# Patient Record
Sex: FEMALE | Race: WHITE | NOT HISPANIC OR LATINO | Employment: FULL TIME | RURAL
[De-identification: names, ages, dates, MRNs, and addresses within clinical notes are randomized per-mention and may not be internally consistent; named-entity substitution may affect disease eponyms.]

---

## 2018-07-16 NOTE — PROGRESS NOTES
Subjective   Jaylon Christian is a 32 y.o. female.     Problem List  1. Morbid Obesity BMI  >30  2. Tobacco user     PShx  2 hernia repair surgeries in 2007 and 2012   [place] teeth removal       SocialHx  -tobacco smoker for 15 years.  Smokes 1 ppd   -occasionally alcohol user  -no illicit drugs  - for 13 years   -3 children  -She currently is a teller at [employer]    FamliyHx  -mother -cholecystectomy cellulitis.   -father - unknown    Pt is 31 yo female who I am seeing for the first time. She is here to establish. She has not seen PCP in years.  She is from [place]. She has lived all over the  and finally settled with  in [facility]. She has no major medical issues other than hernia repair surgery x 2 in 2007 and 2012. She has history of obesity and BMI >30. She also has history of tobacco user and smoke 1 ppd for >15 years. She has tried to quit with wellbutrin.  She has not tried nicotine patch yet. She is willing to quit. She occasionally drinks alcohol no illicit drugs. She is marreid with 3 children. She currently works for "[employer] as a teller. She has family history of cellulitis and gallbladder issues. Her main concerns is weight gain and she started gaining weight with her last child 6 years ago.  She also notes she may have had thyroid issues and an enlarged thyroid. She has not tried various diets.      Obesity   This is a chronic problem. The current episode started more than 1 year ago. The problem occurs constantly. The problem has been unchanged. Associated symptoms include fatigue. Pertinent negatives include no abdominal pain, anorexia, arthralgias, change in bowel habit, chest pain, chills, congestion, coughing, diaphoresis, fever, headaches, joint swelling, myalgias, nausea, neck pain, numbness, rash, sore throat, swollen glands, urinary symptoms, vertigo, visual change or vomiting. Nothing aggravates the symptoms. She has tried nothing for the symptoms. The treatment provided no  "relief.                The following portions of the patient's history were reviewed and updated as appropriate: allergies, current medications, past family history, past medical history, past social history, past surgical history and problem list.  Patient Active Problem List   Diagnosis   • Annual physical exam   • General medical examination   • Morbid obesity (CMS/HCC)   • Need for vaccination   • Tobacco user   • Encounter for screening for endocrine disorder   • Encounter for screening for diabetes mellitus   • Encounter for screening for cardiovascular disorders     No current outpatient prescriptions on file prior to visit.     No current facility-administered medications on file prior to visit.        Review of Systems   Constitutional: Positive for fatigue and unexpected weight gain. Negative for chills, diaphoresis and fever.   HENT: Negative.  Negative for congestion and sore throat.    Eyes: Negative.    Respiratory: Negative.  Negative for cough.    Cardiovascular: Negative.  Negative for chest pain.   Gastrointestinal: Negative.  Negative for abdominal pain, anorexia, change in bowel habit, nausea and vomiting.   Endocrine: Negative.    Genitourinary: Negative.    Musculoskeletal: Negative.  Negative for arthralgias, joint swelling, myalgias and neck pain.   Skin: Negative.  Negative for rash.   Allergic/Immunologic: Negative.    Neurological: Negative.  Negative for vertigo and numbness.   Hematological: Negative.    Psychiatric/Behavioral: Positive for sleep disturbance.       Objective    /68   Pulse 72   Temp 98.6 °F (37 °C)   Ht 137.2 cm (54\")   Wt 94.7 kg (208 lb 12.8 oz)   LMP 07/13/2018   SpO2 98%   BMI 50.34 kg/m²     No results found for any previous visit.       Physical Exam   Constitutional: She is oriented to person, place, and time. She appears well-developed and well-nourished. No distress.   HENT:   Head: Normocephalic and atraumatic.   Right Ear: External ear normal. "   Left Ear: External ear normal.   Eyes: Conjunctivae and EOM are normal. Pupils are equal, round, and reactive to light. Right eye exhibits no discharge. Left eye exhibits no discharge. No scleral icterus.   Neck: Normal range of motion. Neck supple. No JVD present. No tracheal deviation present. No thyromegaly present.   Cardiovascular: Normal rate, regular rhythm and normal heart sounds.    Pulmonary/Chest: Effort normal and breath sounds normal. No stridor. No respiratory distress. She has no wheezes.   Abdominal: Soft. Bowel sounds are normal. She exhibits no distension. There is no tenderness. There is no guarding.   Obese abdomen    Musculoskeletal: Normal range of motion. She exhibits no edema or deformity.   Lymphadenopathy:     She has no cervical adenopathy.   Neurological: She is alert and oriented to person, place, and time. No cranial nerve deficit.   Skin: Skin is warm and dry. She is not diaphoretic. No erythema. No pallor.   Psychiatric: She has a normal mood and affect. Her behavior is normal.   Nursing note and vitals reviewed.        Assessment/Plan   Problems Addressed this Visit        Digestive    Morbid obesity (CMS/HCC)    Relevant Orders    Vitamin D 25 Hydroxy       Other    Annual physical exam    General medical examination - Primary    Relevant Orders    CBC Auto Differential    Comprehensive Metabolic Panel    Hemoglobin A1c    Lipid Panel    TSH    T4, Free    T3, Free    Vitamin D 25 Hydroxy    Need for vaccination    Relevant Orders    Tdap Vaccine Greater Than or Equal To 6yo IM    Pneumococcal Polysaccharide Vaccine 23-Valent (PPSV23) Greater Than or Equal To 3yo Subcutaneous / IM    Tobacco user    Encounter for screening for endocrine disorder    Relevant Orders    TSH    T4, Free    T3, Free    Encounter for screening for diabetes mellitus    Relevant Orders    Hemoglobin A1c    Encounter for screening for cardiovascular disorders    Relevant Orders    Lipid Panel      Other  Visit Diagnoses     Tobacco abuse counseling          -cbc cmp, lipid panel thyroid studies, hga1c, vitamin D levels  -tobacco user -will restart wellbutrin and will also start on nicotine patch   -morbid obesity - Weight loss information provided today including bariatric surgery   -tdap and pneumonia 23 vaccination today  -tobacco user   -annual physical exam today  -advised pt to be safe and call with any questions or concerns. All question addressed today  -recheck in 2 weeks for followup on labwork         This document has been electronically signed by Marino Alvarado MD on July 17, 2018 9:14 AM

## 2018-07-17 ENCOUNTER — OFFICE VISIT (OUTPATIENT)
Dept: FAMILY MEDICINE CLINIC | Facility: CLINIC | Age: 33
End: 2018-07-17

## 2018-07-17 VITALS
WEIGHT: 208.8 LBS | OXYGEN SATURATION: 98 % | TEMPERATURE: 98.6 F | SYSTOLIC BLOOD PRESSURE: 106 MMHG | BODY MASS INDEX: 48.32 KG/M2 | HEART RATE: 72 BPM | HEIGHT: 55 IN | DIASTOLIC BLOOD PRESSURE: 68 MMHG

## 2018-07-17 DIAGNOSIS — Z13.1 ENCOUNTER FOR SCREENING FOR DIABETES MELLITUS: ICD-10-CM

## 2018-07-17 DIAGNOSIS — Z13.29 ENCOUNTER FOR SCREENING FOR ENDOCRINE DISORDER: ICD-10-CM

## 2018-07-17 DIAGNOSIS — Z00.00 ANNUAL PHYSICAL EXAM: ICD-10-CM

## 2018-07-17 DIAGNOSIS — Z71.6 TOBACCO ABUSE COUNSELING: ICD-10-CM

## 2018-07-17 DIAGNOSIS — Z72.0 TOBACCO USER: ICD-10-CM

## 2018-07-17 DIAGNOSIS — Z00.00 GENERAL MEDICAL EXAMINATION: Primary | ICD-10-CM

## 2018-07-17 DIAGNOSIS — E66.01 MORBID OBESITY (HCC): ICD-10-CM

## 2018-07-17 DIAGNOSIS — Z23 NEED FOR VACCINATION: ICD-10-CM

## 2018-07-17 DIAGNOSIS — Z13.6 ENCOUNTER FOR SCREENING FOR CARDIOVASCULAR DISORDERS: ICD-10-CM

## 2018-07-17 PROCEDURE — 90715 TDAP VACCINE 7 YRS/> IM: CPT | Performed by: FAMILY MEDICINE

## 2018-07-17 PROCEDURE — 90472 IMMUNIZATION ADMIN EACH ADD: CPT | Performed by: FAMILY MEDICINE

## 2018-07-17 PROCEDURE — 99214 OFFICE O/P EST MOD 30 MIN: CPT | Performed by: FAMILY MEDICINE

## 2018-07-17 PROCEDURE — 90732 PPSV23 VACC 2 YRS+ SUBQ/IM: CPT | Performed by: FAMILY MEDICINE

## 2018-07-17 PROCEDURE — 90471 IMMUNIZATION ADMIN: CPT | Performed by: FAMILY MEDICINE

## 2018-07-17 RX ORDER — BUPROPION HYDROCHLORIDE 75 MG/1
75 TABLET ORAL
Qty: 60 TABLET | Refills: 3 | Status: SHIPPED | OUTPATIENT
Start: 2018-07-17 | End: 2018-11-01

## 2018-07-17 RX ORDER — NICOTINE 21 MG/24HR
1 PATCH, TRANSDERMAL 24 HOURS TRANSDERMAL EVERY 24 HOURS
Qty: 30 PATCH | Refills: 3 | Status: SHIPPED | OUTPATIENT
Start: 2018-07-17 | End: 2018-11-01

## 2018-07-17 NOTE — PATIENT INSTRUCTIONS
Steps to Quit Smoking  Smoking tobacco can be harmful to your health and can affect almost every organ in your body. Smoking puts you, and those around you, at risk for developing many serious chronic diseases. Quitting smoking is difficult, but it is one of the best things that you can do for your health. It is never too late to quit.  What are the benefits of quitting smoking?  When you quit smoking, you lower your risk of developing serious diseases and conditions, such as:  · Lung cancer or lung disease, such as COPD.  · Heart disease.  · Stroke.  · Heart attack.  · Infertility.  · Osteoporosis and bone fractures.    Additionally, symptoms such as coughing, wheezing, and shortness of breath may get better when you quit. You may also find that you get sick less often because your body is stronger at fighting off colds and infections. If you are pregnant, quitting smoking can help to reduce your chances of having a baby of low birth weight.  How do I get ready to quit?  When you decide to quit smoking, create a plan to make sure that you are successful. Before you quit:  · Pick a date to quit. Set a date within the next two weeks to give you time to prepare.  · Write down the reasons why you are quitting. Keep this list in places where you will see it often, such as on your bathroom mirror or in your car or wallet.  · Identify the people, places, things, and activities that make you want to smoke (triggers) and avoid them. Make sure to take these actions:  ? Throw away all cigarettes at home, at work, and in your car.  ? Throw away smoking accessories, such as ashtrays and lighters.  ? Clean your car and make sure to empty the ashtray.  ? Clean your home, including curtains and carpets.  · Tell your family, friends, and coworkers that you are quitting. Support from your loved ones can make quitting easier.  · Talk with your health care provider about your options for quitting smoking.  · Find out what treatment  options are covered by your health insurance.    What strategies can I use to quit smoking?  Talk with your healthcare provider about different strategies to quit smoking. Some strategies include:  · Quitting smoking altogether instead of gradually lessening how much you smoke over a period of time. Research shows that quitting “cold turkey” is more successful than gradually quitting.  · Attending in-person counseling to help you build problem-solving skills. You are more likely to have success in quitting if you attend several counseling sessions. Even short sessions of 10 minutes can be effective.  · Finding resources and support systems that can help you to quit smoking and remain smoke-free after you quit. These resources are most helpful when you use them often. They can include:  ? Online chats with a counselor.  ? Telephone quitlines.  ? Printed self-help materials.  ? Support groups or group counseling.  ? Text messaging programs.  ? Mobile phone applications.  · Taking medicines to help you quit smoking. (If you are pregnant or breastfeeding, talk with your health care provider first.) Some medicines contain nicotine and some do not. Both types of medicines help with cravings, but the medicines that include nicotine help to relieve withdrawal symptoms. Your health care provider may recommend:  ? Nicotine patches, gum, or lozenges.  ? Nicotine inhalers or sprays.  ? Non-nicotine medicine that is taken by mouth.    Talk with your health care provider about combining strategies, such as taking medicines while you are also receiving in-person counseling. Using these two strategies together makes you more likely to succeed in quitting than if you used either strategy on its own.  If you are pregnant or breastfeeding, talk with your health care provider about finding counseling or other support strategies to quit smoking. Do not take medicine to help you quit smoking unless told to do so by your health care  provider.  What things can I do to make it easier to quit?  Quitting smoking might feel overwhelming at first, but there is a lot that you can do to make it easier. Take these important actions:  · Reach out to your family and friends and ask that they support and encourage you during this time. Call telephone quitlines, reach out to support groups, or work with a counselor for support.  · Ask people who smoke to avoid smoking around you.  · Avoid places that trigger you to smoke, such as bars, parties, or smoke-break areas at work.  · Spend time around people who do not smoke.  · Lessen stress in your life, because stress can be a smoking trigger for some people. To lessen stress, try:  ? Exercising regularly.  ? Deep-breathing exercises.  ? Yoga.  ? Meditating.  ? Performing a body scan. This involves closing your eyes, scanning your body from head to toe, and noticing which parts of your body are particularly tense. Purposefully relax the muscles in those areas.  · Download or purchase mobile phone or tablet apps (applications) that can help you stick to your quit plan by providing reminders, tips, and encouragement. There are many free apps, such as QuitGuide from the CDC (Centers for Disease Control and Prevention). You can find other support for quitting smoking (smoking cessation) through smokefree.gov and other websites.    How will I feel when I quit smoking?  Within the first 24 hours of quitting smoking, you may start to feel some withdrawal symptoms. These symptoms are usually most noticeable 2-3 days after quitting, but they usually do not last beyond 2-3 weeks. Changes or symptoms that you might experience include:  · Mood swings.  · Restlessness, anxiety, or irritation.  · Difficulty concentrating.  · Dizziness.  · Strong cravings for sugary foods in addition to nicotine.  · Mild weight gain.  · Constipation.  · Nausea.  · Coughing or a sore throat.  · Changes in how your medicines work in your  body.  · A depressed mood.  · Difficulty sleeping (insomnia).    After the first 2-3 weeks of quitting, you may start to notice more positive results, such as:  · Improved sense of smell and taste.  · Decreased coughing and sore throat.  · Slower heart rate.  · Lower blood pressure.  · Clearer skin.  · The ability to breathe more easily.  · Fewer sick days.    Quitting smoking is very challenging for most people. Do not get discouraged if you are not successful the first time. Some people need to make many attempts to quit before they achieve long-term success. Do your best to stick to your quit plan, and talk with your health care provider if you have any questions or concerns.  This information is not intended to replace advice given to you by your health care provider. Make sure you discuss any questions you have with your health care provider.  Document Released: 12/12/2002 Document Revised: 08/15/2017 Document Reviewed: 05/03/2016  [email or web address] Interactive Patient Education © 2018 [email or web address]    Exercising to Lose Weight  Exercising can help you to lose weight. In order to lose weight through exercise, you need to do vigorous-intensity exercise. You can tell that you are exercising with vigorous intensity if you are breathing very hard and fast and cannot hold a conversation while exercising.  Moderate-intensity exercise helps to maintain your current weight. You can tell that you are exercising at a moderate level if you have a higher heart rate and faster breathing, but you are still able to hold a conversation.  How often should I exercise?  Choose an activity that you enjoy and set realistic goals. Your health care provider can help you to make an activity plan that works for you. Exercise regularly as directed by your health care provider. This may include:  · Doing resistance training twice each week, such as:  ? Push-ups.  ? Sit-ups.  ? Lifting weights.  ? Using resistance bands.  · Doing a given intensity  of exercise for a given amount of time. Choose from these options:  ? 150 minutes of moderate-intensity exercise every week.  ? 75 minutes of vigorous-intensity exercise every week.  ? A mix of moderate-intensity and vigorous-intensity exercise every week.    Children, pregnant women, people who are out of shape, people who are overweight, and older adults may need to consult a health care provider for individual recommendations. If you have any sort of medical condition, be sure to consult your health care provider before starting a new exercise program.  What are some activities that can help me to lose weight?  · Walking at a rate of at least 4.5 miles an hour.  · Jogging or running at a rate of 5 miles per hour.  · Biking at a rate of at least 10 miles per hour.  · Lap swimming.  · Roller-skating or in-line skating.  · Cross-country skiing.  · Vigorous competitive sports, such as football, basketball, and soccer.  · Jumping rope.  · Aerobic dancing.  How can I be more active in my day-to-day activities?  · Use the stairs instead of the elevator.  · Take a walk during your lunch break.  · If you drive, park your car farther away from work or school.  · If you take public transportation, get off one stop early and walk the rest of the way.  · Make all of your phone calls while standing up and walking around.  · Get up, stretch, and walk around every 30 minutes throughout the day.  What guidelines should I follow while exercising?  · Do not exercise so much that you hurt yourself, feel dizzy, or get very short of breath.  · Consult your health care provider prior to starting a new exercise program.  · Wear comfortable clothes and shoes with good support.  · Drink plenty of water while you exercise to prevent dehydration or heat stroke. Body water is lost during exercise and must be replaced.  · Work out until you breathe faster and your heart beats faster.  This information is not intended to replace advice given  to you by your health care provider. Make sure you discuss any questions you have with your health care provider.  Document Released: 01/20/2012 Document Revised: 05/25/2017 Document Reviewed: 05/21/2015  [employer] Interactive Patient Education © 2018 [employer]  Diet Following Bariatric Surgery  The bariatric diet is designed to provide fluids and nourishment while promoting weight loss and healing after bariatric surgery. The diet is divided into 3 stages. Progress to each stage of the diet with your health care provider’s approval.  What do I need to know about my diet following bariatric surgery?  Your surgeon may have individual guidelines for you about specific foods or the progression of your diet. Follow your surgeon's guidelines. You will follow these general guidelines during all stages of your diet:  · Eat at set times.  · Allow 30-45 minutes for each meal.  · Take small bites. Chew your food until it is almost a liquid before swallowing it. Try setting down your utensils between bites to help yourself eat slower or make an “eat slowly” reminder sign.  · Do not drink liquids for 30 minutes before meals and for 30 minutes after meals.  · Drink between meals.  · Stop eating when you are full. If you feel tightness or pressure in your chest, that means you are full. Wait 30 minutes before you try to eat again.  · Take a chewable multivitamin daily in addition to other supplements as directed by your health care provider.  · Sip at least 48-64 oz of liquid, preferably water, each day.  · Stay away from concentrated sweets with more than 10 g of sugar per serving.  · Protein is a very important part of the diet. Have protein with every meal when possible. Try eating your protein food first.    Stage 1 bariatric diet  Stage 1 will begin after surgery and last until about 2 weeks after surgery or as directed by your health care provider. You will be on clear liquids immediately after surgery. After your health  care provider approves, you will move to full liquids. You will eat at scheduled times during the day (for example at 8 AM, 12 noon, or 5 PM). You will also take a liquid protein supplement as recommended by your dietitian. Your dietitian will let you know how much and how often you can eat.  Diet Guidelines  · Limit intake to ¼ cup of solid foods and ½ cup of beverages per meal.  · You will need at least 60-80 g of protein daily or as determined by your dietitian. Guidelines for choosing a liquid protein supplement include:  ? At least 15 g of protein per 8 oz serving.  ? Less than 20 g total carbohydrate per 8 oz serving.  ? Less than 5 g fat per 8 oz serving.  · Drink at least 48 oz (1440 mL) of fluid daily, which includes your protein supplement.  · To get more protein, you can add 1 Tbsp non-fat dry milk powder to each ¼ cup skim milk.  · Avoid carbonated beverages, caffeine, alcohol, and concentrated sweets such as sugar, cakes, and cookies.  · Take a chewable multivitamin complete with iron. Discuss additional supplements with your health care provider or dietitian.  Beverages (½ cup total per meal)  · Decaffeinated coffee or tea.  · Drinks with less than 25 g of sugar per serving.  · Diet or sugar-free drinks.  · Powdered drinks.  · Sugar-free iced tea.  · Broth.  · Skim milk or lactose-free milk.  · Unsweetened, plain soy milk.  · Sugar-free gelatin dessert or frozen ice pops.  Full Liquid Foods (¼ cup total per meal)  · Protein-rich liquids (limit added protein powder to 25-30 g per serving).  · Low-fat cream soup or soup that has been blended.  · Artificially sweetened yogurt.  · Sugar-free pudding.  · Blended low-fat cottage cheese.  · Plain yogurt or Greek yogurt (low-fat).  · Unsweetened applesauce.  · Hot wheat cereal, cream of rice, grits.  Stage 2 bariatric diet (soft or pureed diet)  Stage 2 starts about 2 weeks after surgery and lasts until about 4 weeks after surgery. During this stage, you will  eat soft, moist, ground, diced, or pureed foods in small meals, 3-6 times a day. Focus on protein-rich foods. You will also drink a liquid protein supplement between meals 2 times a day. After a week of soft protein foods, you can begin to add other soft foods in addition to soft proteins. You should meet with your dietitian at this stage to begin preparation for Stage 3 of the bariatric diet.  Diet Guidelines  · Meals should not exceed ¾-1 cup total per meal.  · You will need to blend solid foods to the consistency of applesauce.  · Choose low-fat foods. Low-fat foods are foods with less than 5 g of fat per serving.  · Include a protein with every meal and snack. Eat the protein food first. Try to eat 60-80 g of protein per day when possible.  · Choose grains made from white or refined grain. Choices should have no more than 2 g of fiber per serving.  · Continue to eat mindfully and slowly and always listen to your body.  · Staying hydrated is very important during this stage. Full liquids from Stage 1 may be used for a meal or snack replacement.  · Slowly add other soft foods to your diet. Examples of soft foods that can be added to your diet are listed in the following section.  Soft Protein Foods  · Well-cooked beans and lentils.  · Eggs (scrambled, soft-boiled).  · Tofu and other soft soy products (tempeh or bean veggie burgers).  · Fish.  · Lean poultry, well cooked and soft. Can also try baby food chicken or turkey.  · Ground meats.  · Low-fat cottage cheese.  · Hummus.  · Fat-free or low-fat yogurt.  · Gravy and light mayonnaise (to help moisten).  Other Soft Foods  · Soft fruit. This includes soft canned fruit in light syrup or natural juice, bananas, melons, peaches, pears, and strawberries.  · Well-cooked vegetables.  · Toast or crackers. Make sure these become soft by chewing them at least 20 times.  · Hot wheat cereal.  · Unflavored oatmeal.  · Baby food or toddler fruits and vegetables.  · Chicken or  vegetable broth.  · Blended fruit smoothies.  Stage 3 bariatric diet (regular diet)  This stage starts about 6-8 weeks after surgery and will continue to promote weight loss. You will be allowed to eat foods of various textures. Ask your dietitian to assist you in meal planning and additional behavioral strategies to make this final stage a long-term success.  Diet Guidelines  · Meals should not exceed ¾-1 cup. As you heal and advance, you may be able to eat a little more with each meal. Always listen to your body.  · Your diet should include foods from all the food groups.  · Slowly add recommended foods to your diet. See the following section for a list of recommended foods.  · Eat only at your chosen meal times.  · When you feel full, stop eating.  · Carbohydrates should be limited. Eat no more than 30 g per meal or 130 g per day. There are about 15-20 g of carbohydrates in 1 piece of bread or a medium piece of fruit.  · Stay hydrated. Drink at least 48-64 oz of noncarbonated, zero-calorie fluid per day. Water is the best choice.  · At first, avoid hard-to-digest foods like popcorn, nuts, celery, seeds, and the white parts of citrus fruits. With time you may be able to eat these foods.  · Take any vitamin supplements as directed by your health care provider.  · Do not fast or skip meals. If you are having a hard time eating, talk to your health care provider or dietitian.  What foods can I eat in stage 3?  Grains  Choose whole grains when possible; aim to get half of your total grains as whole grains. These include whole wheat breads, crackers, and pastas. Cold or hot cereals with no added sugars. Rice (brown or white).  Vegetables  Choose a variety of vegetables. All are allowed.  Fruits  Choose a variety of fruits. All are allowed.  Meat and Other Protein Foods  Choose lean sources of protein such as poultry, fish, and eggs. You may need to cook meats to tender at first. Smooth nut butters.  Beans.  Dairy  Choose low-fat or nonfat dairy items (such as cheese, milk, and yogurt).  Beverages  Decaffeinated coffee. Caffeine-free tea. Sugar-free soft drinks without caffeine. Limit alcohol.  Condiments  All are acceptable. Choose low-fat and low-sodium when possible.  Sweets and Desserts  Low-fat, low-sugar options. As part of a healthy diet, everyone should limit added sugars.  Fat and Oil  Choose healthy fats such as olive oil, canola oil, and avocados.  The items listed above may not be a complete list of recommended foods or beverages. Contact your dietitian for more options.  This information is not intended to replace advice given to you by your health care provider. Make sure you discuss any questions you have with your health care provider.  Document Released: 06/23/2004 Document Revised: 05/25/2017 Document Reviewed: 10/22/2014  [employer] Interactive Patient Education © 2018 [employer]    Calorie Counting for Weight Loss  Calories are units of energy. Your body needs a certain amount of calories from food to keep you going throughout the day. When you eat more calories than your body needs, your body stores the extra calories as fat. When you eat fewer calories than your body needs, your body burns fat to get the energy it needs.  Calorie counting means keeping track of how many calories you eat and drink each day. Calorie counting can be helpful if you need to lose weight. If you make sure to eat fewer calories than your body needs, you should lose weight. Ask your health care provider what a healthy weight is for you.  For calorie counting to work, you will need to eat the right number of calories in a day in order to lose a healthy amount of weight per week. A dietitian can help you determine how many calories you need in a day and will give you suggestions on how to reach your calorie goal.  · A healthy amount of weight to lose per week is usually 1-2 lb (0.5-0.9 kg). This usually means that  your daily calorie intake should be reduced by 500-750 calories.  · Eating 1,200 - 1,500 calories per day can help most women lose weight.  · Eating 1,500 - 1,800 calories per day can help most men lose weight.    What is my plan?  My goal is to have __________ calories per day.  If I have this many calories per day, I should lose around __________ pounds per week.  What do I need to know about calorie counting?  In order to meet your daily calorie goal, you will need to:  · Find out how many calories are in each food you would like to eat. Try to do this before you eat.  · Decide how much of the food you plan to eat.  · Write down what you ate and how many calories it had. Doing this is called keeping a food log.    To successfully lose weight, it is important to balance calorie counting with a healthy lifestyle that includes regular activity. Aim for 150 minutes of moderate exercise (such as walking) or 75 minutes of vigorous exercise (such as running) each week.  Where do I find calorie information?    The number of calories in a food can be found on a Nutrition Facts label. If a food does not have a Nutrition Facts label, try to look up the calories online or ask your dietitian for help.  Remember that calories are listed per serving. If you choose to have more than one serving of a food, you will have to multiply the calories per serving by the amount of servings you plan to eat. For example, the label on a package of bread might say that a serving size is 1 slice and that there are 90 calories in a serving. If you eat 1 slice, you will have eaten 90 calories. If you eat 2 slices, you will have eaten 180 calories.  How do I keep a food log?  Immediately after each meal, record the following information in your food log:  · What you ate. Don't forget to include toppings, sauces, and other extras on the food.  · How much you ate. This can be measured in cups, ounces, or number of items.  · How many calories each  "food and drink had.  · The total number of calories in the meal.    Keep your food log near you, such as in a small notebook in your pocket, or use a mobile joanna or website. Some programs will calculate calories for you and show you how many calories you have left for the day to meet your goal.  What are some calorie counting tips?  · Use your calories on foods and drinks that will fill you up and not leave you hungry:  ? Some examples of foods that fill you up are nuts and nut butters, vegetables, lean proteins, and high-fiber foods like whole grains. High-fiber foods are foods with more than 5 g fiber per serving.  ? Drinks such as sodas, specialty coffee drinks, alcohol, and juices have a lot of calories, yet do not fill you up.  · Eat nutritious foods and avoid empty calories. Empty calories are calories you get from foods or beverages that do not have many vitamins or protein, such as candy, sweets, and soda. It is better to have a nutritious high-calorie food (such as an avocado) than a food with few nutrients (such as a bag of chips).  · Know how many calories are in the foods you eat most often. This will help you calculate calorie counts faster.  · Pay attention to calories in drinks. Low-calorie drinks include water and unsweetened drinks.  · Pay attention to nutrition labels for \"low fat\" or \"fat free\" foods. These foods sometimes have the same amount of calories or more calories than the full fat versions. They also often have added sugar, starch, or salt, to make up for flavor that was removed with the fat.  · Find a way of tracking calories that works for you. Get creative. Try different apps or programs if writing down calories does not work for you.  What are some portion control tips?  · Know how many calories are in a serving. This will help you know how many servings of a certain food you can have.  · Use a measuring cup to measure serving sizes. You could also try weighing out portions on a " kitchen scale. With time, you will be able to estimate serving sizes for some foods.  · Take some time to put servings of different foods on your favorite plates, bowls, and cups so you know what a serving looks like.  · Try not to eat straight from a bag or box. Doing this can lead to overeating. Put the amount you would like to eat in a cup or on a plate to make sure you are eating the right portion.  · Use smaller plates, glasses, and bowls to prevent overeating.  · Try not to multitask (for example, watch TV or use your computer) while eating. If it is time to eat, sit down at a table and enjoy your food. This will help you to know when you are full. It will also help you to be aware of what you are eating and how much you are eating.  What are tips for following this plan?  Reading food labels  · Check the calorie count compared to the serving size. The serving size may be smaller than what you are used to eating.  · Check the source of the calories. Make sure the food you are eating is high in vitamins and protein and low in saturated and trans fats.  Shopping  · Read nutrition labels while you shop. This will help you make healthy decisions before you decide to purchase your food.  · Make a grocery list and stick to it.  Cooking  · Try to cook your favorite foods in a healthier way. For example, try baking instead of frying.  · Use low-fat dairy products.  Meal planning  · Use more fruits and vegetables. Half of your plate should be fruits and vegetables.  · Include lean proteins like poultry and fish.  How do I count calories when eating out?  · Ask for smaller portion sizes.  · Consider sharing an entree and sides instead of getting your own entree.  · If you get your own entree, eat only half. Ask for a box at the beginning of your meal and put the rest of your entree in it so you are not tempted to eat it.  · If calories are listed on the menu, choose the lower calorie options.  · Choose dishes that  include vegetables, fruits, whole grains, low-fat dairy products, and lean protein.  · Choose items that are boiled, broiled, grilled, or steamed. Stay away from items that are buttered, battered, fried, or served with cream sauce. Items labeled “crispy” are usually fried, unless stated otherwise.  · Choose water, low-fat milk, unsweetened iced tea, or other drinks without added sugar. If you want an alcoholic beverage, choose a lower calorie option such as a glass of wine or light beer.  · Ask for dressings, sauces, and syrups on the side. These are usually high in calories, so you should limit the amount you eat.  · If you want a salad, choose a garden salad and ask for grilled meats. Avoid extra toppings like slaughter, cheese, or fried items. Ask for the dressing on the side, or ask for olive oil and vinegar or lemon to use as dressing.  · Estimate how many servings of a food you are given. For example, a serving of cooked rice is ½ cup or about the size of half a baseball. Knowing serving sizes will help you be aware of how much food you are eating at restaurants. The list below tells you how big or small some common portion sizes are based on everyday objects:  ? 1 oz--4 stacked dice.  ? 3 oz--1 deck of cards.  ? 1 tsp--1 die.  ? 1 Tbsp--½ a ping-pong ball.  ? 2 Tbsp--1 ping-pong ball.  ? ½ cup--½ baseball.  ? 1 cup--1 baseball.  Summary  · Calorie counting means keeping track of how many calories you eat and drink each day. If you eat fewer calories than your body needs, you should lose weight.  · A healthy amount of weight to lose per week is usually 1-2 lb (0.5-0.9 kg). This usually means reducing your daily calorie intake by 500-750 calories.  · The number of calories in a food can be found on a Nutrition Facts label. If a food does not have a Nutrition Facts label, try to look up the calories online or ask your dietitian for help.  · Use your calories on foods and drinks that will fill you up, and not on  foods and drinks that will leave you hungry.  · Use smaller plates, glasses, and bowls to prevent overeating.  This information is not intended to replace advice given to you by your health care provider. Make sure you discuss any questions you have with your health care provider.  Document Released: 12/18/2006 Document Revised: 11/17/2017 Document Reviewed: 11/17/2017  [employer] Interactive Patient Education © 2018 [employer].    Bariatric Surgery Information  Bariatric surgery, also called weight loss surgery, is a procedure that helps you lose weight. You may consider or your health care provider may suggest bariatric surgery if:  · You are severely obese and have been unable to lose weight through diet and exercise.  · You have health problems related to obesity, such as:  ? Type 2 diabetes.  ? Heart disease.  ? Lung disease.    How does bariatric surgery help me lose weight?  Bariatric surgery helps you lose weight by decreasing how much food your body absorbs. This is done by closing off part of your stomach to make it smaller. This restricts the amount of food your stomach can hold. Bariatric surgery can also change your body’s regular digestive process, so that food bypasses the parts of your body that absorb calories and nutrients.  If you decide to have bariatric surgery, it is important to continue to eat a healthy diet and exercise regularly after the surgery.  What are the different kinds of bariatric surgery?  There are two kinds of bariatric surgeries:  · Restrictive surgeries make your stomach smaller. They do not change your digestive process. The smaller the size of your new stomach, the less food you can eat. There are different types of restrictive surgeries.  · Malabsorptive surgeries both make your stomach smaller and alter your digestive process so that your body processes less calories and nutrients. These are the most common kind of bariatric surgery. There are different types of  malabsorptive surgeries.    What are the different types of restrictive surgery?  Adjustable Gastric Banding  In this procedure, an inflatable band is placed around your stomach near the upper end. This makes the passageway for food into the rest of your stomach much smaller. The band can be adjusted, making it tighter or looser, by filling it with salt solution. Your surgeon can adjust the band based on how are you feeling and how much weight you are losing. The band can be removed in the future.  Vertical Banded Gastroplasty  In this procedure, staples are used to separate your stomach into two parts, a small upper pouch and a bigger lower pouch. This decreases how much food you can eat.  Sleeve Gastrectomy  In this procedure, your stomach is made smaller. This is done by surgically removing a large part of your stomach. When your stomach is smaller, you feel full more quickly and reduce how much you eat.  What are the different types of malabsorptive surgery?  [place]-en-Y Gastric Bypass (RGB)  This is the most common weight loss surgery. In this procedure, a small stomach pouch is created in the upper part of your stomach. Next, this small stomach pouch is attached directly to the middle part of your small intestine. The farther down your small intestine the new connection is made, the fewer calories and nutrients you will absorb.  Biliopancreatic Diversion with Duodenal Switch (BPD/DS)  This is a multi-step procedure. In this procedure, a large part of your stomach is removed, making your stomach smaller. Next, this smaller stomach is attached to the lower part of your small intestine. Like the RGB surgery, you absorb fewer calories and nutrients the farther down your small intestine the attachment is made.  What are the risks of bariatric surgery?  As with any surgical procedure, each type of bariatric surgery has its own risks. These risks also depend on your age, your overall health, and any other medical  conditions you may have. When deciding on bariatric surgery, it is very important to:  · Talk to your health care provider and choose the surgery that is best for you.  · Ask your health care provider about specific risks for the surgery you choose.    Where to find more information:  · American Society for Metabolic & Bariatric Surgery: www.asmbs.org  · Weight-control Information Network (WIN): Audit Verify.niddk.nih.gov  This information is not intended to replace advice given to you by your health care provider. Make sure you discuss any questions you have with your health care provider.  Document Released: 12/18/2006 Document Revised: 05/25/2017 Document Reviewed: 06/18/2014  [employer] Interactive Patient Education © 2017 [employer].  Bupropion tablets (Depression/Mood Disorders)  What is this medicine?  BUPROPION (byoo PROE pee on) is used to treat depression.  This medicine may be used for other purposes; ask your health care provider or pharmacist if you have questions.  COMMON BRAND NAME(S): Wellbutrin  What should I tell my health care provider before I take this medicine?  They need to know if you have any of these conditions:  -an eating disorder, such as anorexia or bulimia  -bipolar disorder or psychosis  -diabetes or high blood sugar, treated with medication  -glaucoma  -heart disease, previous heart attack, or irregular heart beat  -head injury or brain tumor  -high blood pressure  -kidney or liver disease  -seizures  -suicidal thoughts or a previous suicide attempt  -Tourette's syndrome  -weight loss  -an unusual or allergic reaction to bupropion, other medicines, foods, dyes, or preservatives  -breast-feeding  -pregnant or trying to become pregnant  How should I use this medicine?  Take this medicine by mouth with a glass of water. Follow the directions on the prescription label. You can take it with or without food. If it upsets your stomach, take it with food. Take your medicine at regular intervals. Do  not take your medicine more often than directed. Do not stop taking this medicine suddenly except upon the advice of your doctor. Stopping this medicine too quickly may cause serious side effects or your condition may worsen.  A special MedGuide will be given to you by the pharmacist with each prescription and refill. Be sure to read this information carefully each time.  Talk to your pediatrician regarding the use of this medicine in children. Special care may be needed.  Overdosage: If you think you have taken too much of this medicine contact a poison control center or emergency room at once.  NOTE: This medicine is only for you. Do not share this medicine with others.  What if I miss a dose?  If you miss a dose, take it as soon as you can. If it is less than four hours to your next dose, take only that dose and skip the missed dose. Do not take double or extra doses.  What may interact with this medicine?  Do not take this medicine with any of the following medications:  -linezolid  -MAOIs like Azilect, Carbex, Eldepryl, Marplan, Nardil, and Parnate  -methylene blue (injected into a vein)  -other medicines that contain bupropion like Zyban  This medicine may also interact with the following medications:  -alcohol  -certain medicines for anxiety or sleep  -certain medicines for blood pressure like metoprolol, propranolol  -certain medicines for depression or psychotic disturbances  -certain medicines for HIV or AIDS like efavirenz, lopinavir, nelfinavir, ritonavir  -certain medicines for irregular heart beat like propafenone, flecainide  -certain medicines for Parkinson's disease like amantadine, levodopa  -certain medicines for seizures like carbamazepine, phenytoin, phenobarbital  -cimetidine  -clopidogrel  -cyclophosphamide  -digoxin  -furazolidone  -isoniazid  -nicotine  -orphenadrine  -procarbazine  -steroid medicines like prednisone or cortisone  -stimulant medicines for attention disorders, weight loss, or  to stay awake  -tamoxifen  -theophylline  -thiotepa  -ticlopidine  -tramadol  -warfarin  This list may not describe all possible interactions. Give your health care provider a list of all the medicines, herbs, non-prescription drugs, or dietary supplements you use. Also tell them if you smoke, drink alcohol, or use illegal drugs. Some items may interact with your medicine.  What should I watch for while using this medicine?  Tell your doctor if your symptoms do not get better or if they get worse. Visit your doctor or health care professional for regular checks on your progress. Because it may take several weeks to see the full effects of this medicine, it is important to continue your treatment as prescribed by your doctor.  Patients and their families should watch out for new or worsening thoughts of suicide or depression. Also watch out for sudden changes in feelings such as feeling anxious, agitated, panicky, irritable, hostile, aggressive, impulsive, severely restless, overly excited and hyperactive, or not being able to sleep. If this happens, especially at the beginning of treatment or after a change in dose, call your health care professional.  Avoid alcoholic drinks while taking this medicine. Drinking excessive alcoholic beverages, using sleeping or anxiety medicines, or quickly stopping the use of these agents while taking this medicine may increase your risk for a seizure.  Do not drive or use heavy machinery until you know how this medicine affects you. This medicine can impair your ability to perform these tasks.  Do not take this medicine close to bedtime. It may prevent you from sleeping.  Your mouth may get dry. Chewing sugarless gum or sucking hard candy, and drinking plenty of water may help. Contact your doctor if the problem does not go away or is severe.  What side effects may I notice from receiving this medicine?  Side effects that you should report to your doctor or health care professional  as soon as possible:  -allergic reactions like skin rash, itching or hives, swelling of the face, lips, or tongue  -breathing problems  -changes in vision  -confusion  -elevated mood, decreased need for sleep, racing thoughts, impulsive behavior  -fast or irregular heartbeat  -hallucinations, loss of contact with reality  -increased blood pressure  -redness, blistering, peeling or loosening of the skin, including inside the mouth  -seizures  -suicidal thoughts or other mood changes  -unusually weak or tired  -vomiting  Side effects that usually do not require medical attention (report to your doctor or health care professional if they continue or are bothersome):  -constipation  -headache  -loss of appetite  -nausea  -tremors  -weight loss  This list may not describe all possible side effects. Call your doctor for medical advice about side effects. You may report side effects to FDA at 5-092-FDA-9100.  Where should I keep my medicine?  Keep out of the reach of children.  Store at room temperature between 20 and 25 degrees C (68 and 77 degrees F), away from direct sunlight and moisture. Keep tightly closed. Throw away any unused medicine after the expiration date.  NOTE: This sheet is a summary. It may not cover all possible information. If you have questions about this medicine, talk to your doctor, pharmacist, or health care provider.  © 2018 Elsevier/Gold Standard (2017-06-09 13:44:21)  Nicotine skin patches  What is this medicine?  NICOTINE (GLADYS oh teen) helps people stop smoking. The patches replace the nicotine found in cigarettes and help to decrease withdrawal effects. They are most effective when used in combination with a stop-smoking program.  This medicine may be used for other purposes; ask your health care provider or pharmacist if you have questions.  COMMON BRAND NAME(S): Habitrol, Nicoderm CQ, Nicotrol  What should I tell my health care provider before I take this medicine?  They need to know if you  have any of these conditions:  -diabetes  -heart disease, angina, irregular heartbeat or previous heart attack  -high blood pressure  -lung disease, including asthma  -overactive thyroid  -pheochromocytoma  -seizures or a history of seizures  -skin problems, like eczema  -stomach problems or ulcers  -an unusual or allergic reaction to nicotine, adhesives, other medicines, foods, dyes, or preservatives  -pregnant or trying to get pregnant  -breast-feeding  How should I use this medicine?  This medicine is for use on the skin. Follow the directions that come with the patches. Find an area of skin on your upper arm, chest, or back that is clean, dry, greaseless, undamaged and hairless. Wash hands with plain soap and water. Do not use anything that contains aloe, lanolin or glycerin as these may prevent the patch from sticking. Dry thoroughly. Remove the patch from the sealed pouch. Do not try to cut or trim the patch. Using your palm, press the patch firmly in place for 10 seconds to make sure that there is good contact with your skin. After applying the patch, wash your hands. Change the patch every day, keeping to a regular schedule. When you apply a new patch, use a new area of skin. Wait at least 1 week before using the same area again.  Talk to your pediatrician regarding the use of this medicine in children. Special care may be needed.  Overdosage: If you think you have taken too much of this medicine contact a poison control center or emergency room at once.  NOTE: This medicine is only for you. Do not share this medicine with others.  What if I miss a dose?  If you forget to replace a patch, use it as soon as you can. Only use one patch at a time and do not leave on the skin for longer than directed. If a patch falls off, you can replace it, but keep to your schedule and remove the patch at the right time.  What may interact with this medicine?  -medicines for asthma  -medicines for blood pressure  -medicines  for mental depression  This list may not describe all possible interactions. Give your health care provider a list of all the medicines, herbs, non-prescription drugs, or dietary supplements you use. Also tell them if you smoke, drink alcohol, or use illegal drugs. Some items may interact with your medicine.  What should I watch for while using this medicine?  You should begin using the nicotine patch the day you stop smoking. It is okay if you do not succeed at your attempt to quit and have a cigarette. You can still continue your quit attempt and keep using the product as directed. Just throw away your cigarettes and get back to your quit plan.  You can keep the patch in place during swimming, bathing, and showering. If your patch falls off during these activities, replace it.  When you first apply the patch, your skin may itch or burn. This should go away soon. When you remove a patch, the skin may look red, but this should only last for a few days. Call your doctor or health care professional if skin redness does not go away after 4 days, if your skin swells, or if you get a rash.  If you are a diabetic and you quit smoking, the effects of insulin may be increased and you may need to reduce your insulin dose. Check with your doctor or health care professional about how you should adjust your insulin dose.  If you are going to have a magnetic resonance imaging (MRI) procedure, tell your MRI technician if you have this patch on your body. It must be removed before a MRI.  What side effects may I notice from receiving this medicine?  Side effects that you should report to your doctor or health care professional as soon as possible:  -allergic reactions like skin rash, itching or hives, swelling of the face, lips, or tongue  -breathing problems  -changes in hearing  -changes in vision  -chest pain  -cold sweats  -confusion  -fast, irregular heartbeat  -feeling faint or lightheaded, falls  -headache  -increased  saliva  -skin redness that lasts more than 4 days  -stomach pain  -signs and symptoms of nicotine overdose like nausea; vomiting; dizziness; weakness; and rapid heartbeat  Side effects that usually do not require medical attention (report to your doctor or health care professional if they continue or are bothersome):  -diarrhea  -dry mouth  -hiccups  -irritability  -nervousness or restlessness  -trouble sleeping or vivid dreams  This list may not describe all possible side effects. Call your doctor for medical advice about side effects. You may report side effects to FDA at 9-304-FDA-8571.  Where should I keep my medicine?  Keep out of the reach of children.  Store at room temperature between 20 and 25 degrees C (68 and 77 degrees F). Protect from heat and light. Store in manufacturers packaging until ready to use. Throw away unused medicine after the expiration date. When you remove a patch, fold with sticky sides together; put in an empty opened pouch and throw away.  NOTE: This sheet is a summary. It may not cover all possible information. If you have questions about this medicine, talk to your doctor, pharmacist, or health care provider.  © 2018 Elsevier/Gold Standard (2015-11-16 15:46:21)    Coping with Quitting Smoking  Quitting smoking is a physical and mental challenge. You will face cravings, withdrawal symptoms, and temptation. Before quitting, work with your health care provider to make a plan that can help you cope. Preparation can help you quit and keep you from giving in.  How can I cope with cravings?  Cravings usually last for 5-10 minutes. If you get through it, the craving will pass. Consider taking the following actions to help you cope with cravings:  · Keep your mouth busy:  ? Chew sugar-free gum.  ? Suck on hard candies or a straw.  ? Brush your teeth.  · Keep your hands and body busy:  ? Immediately change to a different activity when you feel a craving.  ? Squeeze or play with a ball.  ? Do  an activity or a hobby, like making bead jewelry, practicing needlepoint, or working with wood.  ? Mix up your normal routine.  ? Take a short exercise break. Go for a quick walk or run up and down stairs.  ? Spend time in public places where smoking is not allowed.  · Focus on doing something kind or helpful for someone else.  · Call a friend or family member to talk during a craving.  · Join a support group.  · Call a quit line, such as [employer]QUIT-NOW.  · Talk with your health care provider about medicines that might help you cope with cravings and make quitting easier for you.    How can I deal with withdrawal symptoms?  Your body may experience negative effects as it tries to get used to not having nicotine in the system. These effects are called withdrawal symptoms. They may include:  · Feeling hungrier than normal.  · Trouble concentrating.  · Irritability.  · Trouble sleeping.  · Feeling depressed.  · Restlessness and agitation.  · Craving a cigarette.    To manage withdrawal symptoms:  · Avoid places, people, and activities that trigger your cravings.  · Remember why you want to quit.  · Get plenty of sleep.  · Avoid coffee and other caffeinated drinks. These may worsen some of your symptoms.    How can I handle social situations?  Social situations can be difficult when you are quitting smoking, especially in the first few weeks. To manage this, you can:  · Avoid parties, bars, and other social situations where people might be smoking.  · Avoid alcohol.  · Leave right away if you have the urge to smoke.  · Explain to your family and friends that you are quitting smoking. Ask for understanding and support.  · Plan activities with friends or family where smoking is not an option.    What are some ways I can cope with stress?  Wanting to smoke may cause stress, and stress can make you want to smoke. Find ways to manage your stress. Relaxation techniques can help. For example:  · Breathe slowly and deeply, in  through your nose and out through your mouth.  · Listen to soothing, relaxing music.  · Talk with a family member or friend about your stress.  · Light a candle.  · Soak in a bath or take a shower.  · Think about a peaceful place.    What are some ways I can prevent weight gain?  Be aware that many people gain weight after they quit smoking. However, not everyone does. To keep from gaining weight, have a plan in place before you quit and stick to the plan after you quit. Your plan should include:  · Having healthy snacks. When you have a craving, it may help to:  ? Eat plain popcorn, crunchy carrots, celery, or other cut vegetables.  ? Chew sugar-free gum.  · Changing how you eat:  ? Eat small portion sizes at meals.  ? Eat 4-6 small meals throughout the day instead of 1-2 large meals a day.  ? Be mindful when you eat. Do not watch television or do other things that might distract you as you eat.  · Exercising regularly:  ? Make time to exercise each day. If you do not have time for a long workout, do short bouts of exercise for 5-10 minutes several times a day.  ? Do some form of strengthening exercise, like weight lifting, and some form of aerobic exercise, like running or swimming.  · Drinking plenty of water or other low-calorie or no-calorie drinks. Drink 6-8 glasses of water daily, or as much as instructed by your health care provider.    Summary  · Quitting smoking is a physical and mental challenge. You will face cravings, withdrawal symptoms, and temptation to smoke again. Preparation can help you as you go through these challenges.  · You can cope with cravings by keeping your mouth busy (such as by chewing gum), keeping your body and hands busy, and making calls to family, friends, or a helpline for people who want to quit smoking.  · You can cope with withdrawal symptoms by avoiding places where people smoke, avoiding drinks with caffeine, and getting plenty of rest.  · Ask your health care provider  about the different ways to prevent weight gain, avoid stress, and handle social situations.  This information is not intended to replace advice given to you by your health care provider. Make sure you discuss any questions you have with your health care provider.  Document Released: 12/15/2017 Document Revised: 12/15/2017 Document Reviewed: 12/15/2017  [employer] Interactive Patient Education © 2018 [employer]

## 2018-07-18 LAB
25(OH)D3 SERPL-MCNC: 44.3 NG/ML (ref 30–100)
ALBUMIN SERPL-MCNC: 4.4 G/DL (ref 3.4–4.8)
ALBUMIN/GLOB SERPL: 1.5 G/DL (ref 1.1–1.8)
ALP SERPL-CCNC: 69 U/L (ref 38–126)
ALT SERPL W P-5'-P-CCNC: 33 U/L (ref 9–52)
ANION GAP SERPL CALCULATED.3IONS-SCNC: 8 MMOL/L (ref 5–15)
ARTICHOKE IGE QN: 88 MG/DL (ref 1–129)
AST SERPL-CCNC: 19 U/L (ref 14–36)
BASOPHILS # BLD AUTO: 0.03 10*3/MM3 (ref 0–0.2)
BASOPHILS NFR BLD AUTO: 0.3 % (ref 0–2)
BILIRUB SERPL-MCNC: 0.7 MG/DL (ref 0.2–1.3)
BUN BLD-MCNC: 14 MG/DL (ref 7–21)
BUN/CREAT SERPL: 20.9 (ref 7–25)
CALCIUM SPEC-SCNC: 9.3 MG/DL (ref 8.4–10.2)
CHLORIDE SERPL-SCNC: 107 MMOL/L (ref 95–110)
CHOLEST SERPL-MCNC: 159 MG/DL (ref 0–199)
CO2 SERPL-SCNC: 23 MMOL/L (ref 22–31)
CREAT BLD-MCNC: 0.67 MG/DL (ref 0.5–1)
DEPRECATED RDW RBC AUTO: 46.2 FL (ref 36.4–46.3)
EOSINOPHIL # BLD AUTO: 0.12 10*3/MM3 (ref 0–0.7)
EOSINOPHIL NFR BLD AUTO: 1.3 % (ref 0–7)
ERYTHROCYTE [DISTWIDTH] IN BLOOD BY AUTOMATED COUNT: 13.5 % (ref 11.5–14.5)
GFR SERPL CREATININE-BSD FRML MDRD: 103 ML/MIN/1.73 (ref 64–149)
GLOBULIN UR ELPH-MCNC: 3 GM/DL (ref 2.3–3.5)
GLUCOSE BLD-MCNC: 82 MG/DL (ref 60–100)
HBA1C MFR BLD: 5.6 % (ref 4–5.6)
HCT VFR BLD AUTO: 44.8 % (ref 35–45)
HDLC SERPL-MCNC: 50 MG/DL (ref 60–200)
HGB BLD-MCNC: 15 G/DL (ref 12–15.5)
IMM GRANULOCYTES # BLD: 0.01 10*3/MM3 (ref 0–0.02)
IMM GRANULOCYTES NFR BLD: 0.1 % (ref 0–0.5)
LDLC/HDLC SERPL: 1.79 {RATIO} (ref 0–3.22)
LYMPHOCYTES # BLD AUTO: 1.97 10*3/MM3 (ref 0.6–4.2)
LYMPHOCYTES NFR BLD AUTO: 21.1 % (ref 10–50)
MCH RBC QN AUTO: 30.9 PG (ref 26.5–34)
MCHC RBC AUTO-ENTMCNC: 33.5 G/DL (ref 31.4–36)
MCV RBC AUTO: 92.4 FL (ref 80–98)
MONOCYTES # BLD AUTO: 0.63 10*3/MM3 (ref 0–0.9)
MONOCYTES NFR BLD AUTO: 6.7 % (ref 0–12)
NEUTROPHILS # BLD AUTO: 6.58 10*3/MM3 (ref 2–8.6)
NEUTROPHILS NFR BLD AUTO: 70.5 % (ref 37–80)
PLATELET # BLD AUTO: 254 10*3/MM3 (ref 150–450)
PMV BLD AUTO: 10.7 FL (ref 8–12)
POTASSIUM BLD-SCNC: 4.2 MMOL/L (ref 3.5–5.1)
PROT SERPL-MCNC: 7.4 G/DL (ref 6.3–8.6)
RBC # BLD AUTO: 4.85 10*6/MM3 (ref 3.77–5.16)
SODIUM BLD-SCNC: 138 MMOL/L (ref 137–145)
T4 FREE SERPL-MCNC: 1.13 NG/DL (ref 0.78–2.19)
TRIGL SERPL-MCNC: 98 MG/DL (ref 20–199)
TSH SERPL DL<=0.05 MIU/L-ACNC: 0.83 MIU/ML (ref 0.46–4.68)
WBC NRBC COR # BLD: 9.34 10*3/MM3 (ref 3.2–9.8)

## 2018-07-18 PROCEDURE — 80061 LIPID PANEL: CPT | Performed by: FAMILY MEDICINE

## 2018-07-18 PROCEDURE — 85025 COMPLETE CBC W/AUTO DIFF WBC: CPT | Performed by: FAMILY MEDICINE

## 2018-07-18 PROCEDURE — 82306 VITAMIN D 25 HYDROXY: CPT | Performed by: FAMILY MEDICINE

## 2018-07-18 PROCEDURE — 84443 ASSAY THYROID STIM HORMONE: CPT | Performed by: FAMILY MEDICINE

## 2018-07-18 PROCEDURE — 84481 FREE ASSAY (FT-3): CPT | Performed by: FAMILY MEDICINE

## 2018-07-18 PROCEDURE — 36415 COLL VENOUS BLD VENIPUNCTURE: CPT | Performed by: FAMILY MEDICINE

## 2018-07-18 PROCEDURE — 83036 HEMOGLOBIN GLYCOSYLATED A1C: CPT | Performed by: FAMILY MEDICINE

## 2018-07-18 PROCEDURE — 84439 ASSAY OF FREE THYROXINE: CPT | Performed by: FAMILY MEDICINE

## 2018-07-18 PROCEDURE — 80053 COMPREHEN METABOLIC PANEL: CPT | Performed by: FAMILY MEDICINE

## 2018-07-19 LAB — T3FREE SERPL-MCNC: 3.2 PG/ML (ref 2–4.4)

## 2018-07-20 ENCOUNTER — TELEPHONE (OUTPATIENT)
Dept: FAMILY MEDICINE CLINIC | Facility: CLINIC | Age: 33
End: 2018-07-20

## 2018-07-30 NOTE — PROGRESS NOTES
Subjective   Jaylon Christian is a 32 y.o. female.     Problem List  1. Morbid Obesity BMI  >30  2. Tobacco user   3. Dyslipidemia ASCVD risk  Low     PShx  2 hernia repair surgeries in 2007 and 2012   [place] teeth removal       SocialHx  -tobacco smoker for 15 years.  Smokes 1 ppd   -occasionally alcohol user  -no illicit drugs  - for 13 years   -3 children  -She currently is a teller at [employer]    FamliyHx  -mother -cholecystectomy cellulitis.   -father - unknown    Pt is 33 yo female who I am seeing for the first time. She is here to establish. She has not seen PCP in years.  She is from [place]. She has lived all over the  and finally settled with  in [facility]. She has no major medical issues other than hernia repair surgery x 2 in 2007 and 2012. She has history of obesity and BMI >30. She also has history of tobacco user and smoke 1 ppd for >15 years. She has tried to quit with wellbutrin.  She has not tried nicotine patch yet. She is willing to quit. She occasionally drinks alcohol no illicit drugs. She is marreid with 3 children. 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 She has family history of cellulitis and gallbladder issues. Her main concerns is weight gain and she started gaining weight with her last child 6 years ago.  She also notes she may have had thyroid issues and an enlarged thyroid. She has not tried various diets.      7/3/18  Pt is here for followup and recheck. On last visit pt was started on wellbutrin 75 mg PO BId for nicotine dependence, smoking She was also given nicotine patch to quit smoking. Since last visit she had labwork that showed normal Vitamin D levels along with thyroid studies normal. hga1c was normal. CBC and CMP showed normal renal function and liver function. Pt also has normal WBC and hemoglobin. Lipid panel showed total cholesterol normal at 159 and HDL at 50 and LDL at 88.  Pt is also due for a pap smear. She has quit smoking for 8 days. Now. She  states nicotine patch and wellbutrin is helping     Obesity   This is a chronic problem. The current episode started more than 1 year ago. The problem occurs constantly. The problem has been unchanged. Associated symptoms include fatigue. Pertinent negatives include no abdominal pain, anorexia, arthralgias, change in bowel habit, chest pain, chills, congestion, coughing, diaphoresis, fever, headaches, joint swelling, myalgias, nausea, neck pain, numbness, rash, sore throat, swollen glands, urinary symptoms, vertigo, visual change or vomiting. Nothing aggravates the symptoms. She has tried nothing for the symptoms. The treatment provided no relief.                The following portions of the patient's history were reviewed and updated as appropriate: allergies, current medications, past family history, past medical history, past social history, past surgical history and problem list.  Patient Active Problem List   Diagnosis   • Annual physical exam   • General medical examination   • Morbid obesity (CMS/HCC)   • Need for vaccination   • Tobacco user   • Encounter for screening for endocrine disorder   • Encounter for screening for diabetes mellitus   • Encounter for screening for cardiovascular disorders     Current Outpatient Prescriptions on File Prior to Visit   Medication Sig Dispense Refill   • buPROPion (WELLBUTRIN) 75 MG tablet Take 1 tablet by mouth 2 (Two) Times a Day. 60 tablet 3   • nicotine (NICODERM CQ) 21 MG/24HR patch Place 1 patch on the skin Daily. 30 patch 3     No current facility-administered medications on file prior to visit.        Review of Systems   Constitutional: Positive for fatigue and unexpected weight gain. Negative for chills, diaphoresis and fever.   HENT: Negative.  Negative for congestion and sore throat.    Eyes: Negative.    Respiratory: Negative.  Negative for cough.    Cardiovascular: Negative.  Negative for chest pain.   Gastrointestinal: Negative.  Negative for abdominal  "pain, anorexia, change in bowel habit, nausea and vomiting.   Endocrine: Negative.    Genitourinary: Negative.    Musculoskeletal: Negative.  Negative for arthralgias, joint swelling, myalgias and neck pain.   Skin: Negative.  Negative for rash.   Allergic/Immunologic: Negative.    Neurological: Negative.  Negative for vertigo and numbness.   Hematological: Negative.    Psychiatric/Behavioral: Positive for sleep disturbance.       Objective    /64   Pulse 60   Temp 98.6 °F (37 °C)   Ht 162.6 cm (64\")   Wt 94.3 kg (207 lb 12.8 oz)   LMP 07/13/2018   SpO2 98%   BMI 35.67 kg/m²     LMP 07/13/2018     Office Visit on 07/17/2018   Component Date Value Ref Range Status   • WBC 07/18/2018 9.34  3.20 - 9.80 10*3/mm3 Final   • RBC 07/18/2018 4.85  3.77 - 5.16 10*6/mm3 Final   • Hemoglobin 07/18/2018 15.0  12.0 - 15.5 g/dL Final   • Hematocrit 07/18/2018 44.8  35.0 - 45.0 % Final   • MCV 07/18/2018 92.4  80.0 - 98.0 fL Final   • MCH 07/18/2018 30.9  26.5 - 34.0 pg Final   • MCHC 07/18/2018 33.5  31.4 - 36.0 g/dL Final   • RDW 07/18/2018 13.5  11.5 - 14.5 % Final   • RDW-SD 07/18/2018 46.2  36.4 - 46.3 fl Final   • MPV 07/18/2018 10.7  8.0 - 12.0 fL Final   • Platelets 07/18/2018 254  150 - 450 10*3/mm3 Final   • Neutrophil % 07/18/2018 70.5  37.0 - 80.0 % Final   • Lymphocyte % 07/18/2018 21.1  10.0 - 50.0 % Final   • Monocyte % 07/18/2018 6.7  0.0 - 12.0 % Final   • Eosinophil % 07/18/2018 1.3  0.0 - 7.0 % Final   • Basophil % 07/18/2018 0.3  0.0 - 2.0 % Final   • Immature Grans % 07/18/2018 0.1  0.0 - 0.5 % Final   • Neutrophils, Absolute 07/18/2018 6.58  2.00 - 8.60 10*3/mm3 Final   • Lymphocytes, Absolute 07/18/2018 1.97  0.60 - 4.20 10*3/mm3 Final   • Monocytes, Absolute 07/18/2018 0.63  0.00 - 0.90 10*3/mm3 Final   • Eosinophils, Absolute 07/18/2018 0.12  0.00 - 0.70 10*3/mm3 Final   • Basophils, Absolute 07/18/2018 0.03  0.00 - 0.20 10*3/mm3 Final   • Immature Grans, Absolute 07/18/2018 0.01  0.00 - 0.02 " 10*3/mm3 Final   • Glucose 07/18/2018 82  60 - 100 mg/dL Final   • BUN 07/18/2018 14  7 - 21 mg/dL Final   • Creatinine 07/18/2018 0.67  0.50 - 1.00 mg/dL Final   • Sodium 07/18/2018 138  137 - 145 mmol/L Final   • Potassium 07/18/2018 4.2  3.5 - 5.1 mmol/L Final   • Chloride 07/18/2018 107  95 - 110 mmol/L Final   • CO2 07/18/2018 23.0  22.0 - 31.0 mmol/L Final   • Calcium 07/18/2018 9.3  8.4 - 10.2 mg/dL Final   • Total Protein 07/18/2018 7.4  6.3 - 8.6 g/dL Final   • Albumin 07/18/2018 4.40  3.40 - 4.80 g/dL Final   • ALT (SGPT) 07/18/2018 33  9 - 52 U/L Final   • AST (SGOT) 07/18/2018 19  14 - 36 U/L Final   • Alkaline Phosphatase 07/18/2018 69  38 - 126 U/L Final   • Total Bilirubin 07/18/2018 0.7  0.2 - 1.3 mg/dL Final   • eGFR Non African Amer 07/18/2018 103  64 - 149 mL/min/1.73 Final   • Globulin 07/18/2018 3.0  2.3 - 3.5 gm/dL Final   • A/G Ratio 07/18/2018 1.5  1.1 - 1.8 g/dL Final   • BUN/Creatinine Ratio 07/18/2018 20.9  7.0 - 25.0 Final   • Anion Gap 07/18/2018 8.0  5.0 - 15.0 mmol/L Final   • Hemoglobin A1C 07/18/2018 5.6  4 - 5.6 % Final   • Total Cholesterol 07/18/2018 159  0 - 199 mg/dL Final   • Triglycerides 07/18/2018 98  20 - 199 mg/dL Final   • HDL Cholesterol 07/18/2018 50* 60 - 200 mg/dL Final   • LDL Cholesterol  07/18/2018 88  1 - 129 mg/dL Final   • LDL/HDL Ratio 07/18/2018 1.79  0.00 - 3.22 Final   • TSH 07/18/2018 0.830  0.460 - 4.680 mIU/mL Final   • Free T4 07/18/2018 1.13  0.78 - 2.19 ng/dL Final   • T3, Free 07/18/2018 3.2  2.0 - 4.4 pg/mL Final   • 25 Hydroxy, Vitamin D 07/18/2018 44.3  30.0 - 100.0 ng/ml Final       Physical Exam   Constitutional: She is oriented to person, place, and time. She appears well-developed and well-nourished. No distress.   HENT:   Head: Normocephalic and atraumatic.   Right Ear: External ear normal.   Left Ear: External ear normal.   Eyes: Pupils are equal, round, and reactive to light. Conjunctivae and EOM are normal. Right eye exhibits no discharge.  Left eye exhibits no discharge. No scleral icterus.   Neck: Normal range of motion. Neck supple. No JVD present. No tracheal deviation present. No thyromegaly present.   Cardiovascular: Normal rate, regular rhythm and normal heart sounds.    Pulmonary/Chest: Effort normal and breath sounds normal. No stridor. No respiratory distress. She has no wheezes.   Abdominal: Soft. Bowel sounds are normal. She exhibits no distension. There is no tenderness. There is no guarding.   Obese abdomen    Musculoskeletal: Normal range of motion. She exhibits no edema or deformity.   Lymphadenopathy:     She has no cervical adenopathy.   Neurological: She is alert and oriented to person, place, and time. No cranial nerve deficit.   Skin: Skin is warm and dry. She is not diaphoretic. No erythema. No pallor.   Psychiatric: She has a normal mood and affect. Her behavior is normal.   Nursing note and vitals reviewed.        Assessment/Plan   Problems Addressed this Visit        Digestive    Morbid obesity (CMS/HCC) - Primary       Other    Tobacco user      Other Visit Diagnoses     Dyslipidemia        Nicotine dependence with nicotine-induced disorder, unspecified nicotine product type          -dyslipidemia-  ASCVD risk low. Gave DASH diet and heart healthy diet information today   -morbid obesity - Weight loss information provided today including bariatric surgery   -tdap and pneumonia 23 vaccination today  -tobacco user - pt has quit smoking for 8 days. She is doing well. Continue on wellbutrin and nicointe patch   -annual physical exam today  -advised pt to be safe and call with any questions or concerns. All question addressed today  -recheck in 2 weeks for followup on labwork   -recheck in 3 months or earlier if any problems arise         This document has been electronically signed by Marino Alvarado MD on July 31, 2018 9:20 AM

## 2018-07-31 ENCOUNTER — OFFICE VISIT (OUTPATIENT)
Dept: FAMILY MEDICINE CLINIC | Facility: CLINIC | Age: 33
End: 2018-07-31

## 2018-07-31 VITALS
BODY MASS INDEX: 35.48 KG/M2 | SYSTOLIC BLOOD PRESSURE: 110 MMHG | DIASTOLIC BLOOD PRESSURE: 64 MMHG | HEIGHT: 64 IN | TEMPERATURE: 98.6 F | HEART RATE: 60 BPM | WEIGHT: 207.8 LBS | OXYGEN SATURATION: 98 %

## 2018-07-31 DIAGNOSIS — E66.01 MORBID OBESITY (HCC): ICD-10-CM

## 2018-07-31 DIAGNOSIS — Z12.4 ENCOUNTER FOR PAPANICOLAOU SMEAR OF CERVIX: Primary | ICD-10-CM

## 2018-07-31 DIAGNOSIS — E78.5 DYSLIPIDEMIA: ICD-10-CM

## 2018-07-31 DIAGNOSIS — F17.209 NICOTINE DEPENDENCE WITH NICOTINE-INDUCED DISORDER, UNSPECIFIED NICOTINE PRODUCT TYPE: ICD-10-CM

## 2018-07-31 DIAGNOSIS — Z72.0 TOBACCO USER: ICD-10-CM

## 2018-07-31 PROCEDURE — 99214 OFFICE O/P EST MOD 30 MIN: CPT | Performed by: FAMILY MEDICINE

## 2018-07-31 NOTE — PATIENT INSTRUCTIONS
"DASH Eating Plan  DASH stands for \"Dietary Approaches to Stop Hypertension.\" The DASH eating plan is a healthy eating plan that has been shown to reduce high blood pressure (hypertension). It may also reduce your risk for type 2 diabetes, heart disease, and stroke. The DASH eating plan may also help with weight loss.  What are tips for following this plan?  General guidelines  · Avoid eating more than 2,300 mg (milligrams) of salt (sodium) a day. If you have hypertension, you may need to reduce your sodium intake to 1,500 mg a day.  · Limit alcohol intake to no more than 1 drink a day for nonpregnant women and 2 drinks a day for men. One drink equals 12 oz of beer, 5 oz of wine, or 1½ oz of hard liquor.  · Work with your health care provider to maintain a healthy body weight or to lose weight. Ask what an ideal weight is for you.  · Get at least 30 minutes of exercise that causes your heart to beat faster (aerobic exercise) most days of the week. Activities may include walking, swimming, or biking.  · Work with your health care provider or diet and nutrition specialist (dietitian) to adjust your eating plan to your individual calorie needs.  Reading food labels  · Check food labels for the amount of sodium per serving. Choose foods with less than 5 percent of the Daily Value of sodium. Generally, foods with less than 300 mg of sodium per serving fit into this eating plan.  · To find whole grains, look for the word \"whole\" as the first word in the ingredient list.  Shopping  · Buy products labeled as \"low-sodium\" or \"no salt added.\"  · Buy fresh foods. Avoid canned foods and premade or frozen meals.  Cooking  · Avoid adding salt when cooking. Use salt-free seasonings or herbs instead of table salt or sea salt. Check with your health care provider or pharmacist before using salt substitutes.  · Do not deleon foods. Cook foods using healthy methods such as baking, boiling, grilling, and broiling instead.  · Cook with " heart-healthy oils, such as olive, canola, soybean, or sunflower oil.  Meal planning    · Eat a balanced diet that includes:  ? 5 or more servings of fruits and vegetables each day. At each meal, try to fill half of your plate with fruits and vegetables.  ? Up to 6-8 servings of whole grains each day.  ? Less than 6 oz of lean meat, poultry, or fish each day. A 3-oz serving of meat is about the same size as a deck of cards. One egg equals 1 oz.  ? 2 servings of low-fat dairy each day.  ? A serving of nuts, seeds, or beans 5 times each week.  ? Heart-healthy fats. Healthy fats called Omega-3 fatty acids are found in foods such as flaxseeds and coldwater fish, like sardines, salmon, and mackerel.  · Limit how much you eat of the following:  ? Canned or prepackaged foods.  ? Food that is high in trans fat, such as fried foods.  ? Food that is high in saturated fat, such as fatty meat.  ? Sweets, desserts, sugary drinks, and other foods with added sugar.  ? Full-fat dairy products.  · Do not salt foods before eating.  · Try to eat at least 2 vegetarian meals each week.  · Eat more home-cooked food and less restaurant, buffet, and fast food.  · When eating at a restaurant, ask that your food be prepared with less salt or no salt, if possible.  What foods are recommended?  The items listed may not be a complete list. Talk with your dietitian about what dietary choices are best for you.  Grains  Whole-grain or whole-wheat bread. Whole-grain or whole-wheat pasta. Brown rice. Oatmeal. Quinoa. Bulgur. Whole-grain and low-sodium cereals. Thao bread. Low-fat, low-sodium crackers. Whole-wheat flour tortillas.  Vegetables  Fresh or frozen vegetables (raw, steamed, roasted, or grilled). Low-sodium or reduced-sodium tomato and vegetable juice. Low-sodium or reduced-sodium tomato sauce and tomato paste. Low-sodium or reduced-sodium canned vegetables.  Fruits  All fresh, dried, or frozen fruit. Canned fruit in natural juice (without  added sugar).  Meat and other protein foods  Skinless chicken or turkey. Ground chicken or turkey. Pork with fat trimmed off. Fish and seafood. Egg whites. Dried beans, peas, or lentils. Unsalted nuts, nut butters, and seeds. Unsalted canned beans. Lean cuts of beef with fat trimmed off. Low-sodium, lean deli meat.  Dairy  Low-fat (1%) or fat-free (skim) milk. Fat-free, low-fat, or reduced-fat cheeses. Nonfat, low-sodium ricotta or cottage cheese. Low-fat or nonfat yogurt. Low-fat, low-sodium cheese.  Fats and oils  Soft margarine without trans fats. Vegetable oil. Low-fat, reduced-fat, or light mayonnaise and salad dressings (reduced-sodium). Canola, safflower, olive, soybean, and sunflower oils. Avocado.  Seasoning and other foods  Herbs. Spices. Seasoning mixes without salt. Unsalted popcorn and pretzels. Fat-free sweets.  What foods are not recommended?  The items listed may not be a complete list. Talk with your dietitian about what dietary choices are best for you.  Grains  Baked goods made with fat, such as croissants, muffins, or some breads. Dry pasta or rice meal packs.  Vegetables  Creamed or fried vegetables. Vegetables in a cheese sauce. Regular canned vegetables (not low-sodium or reduced-sodium). Regular canned tomato sauce and paste (not low-sodium or reduced-sodium). Regular tomato and vegetable juice (not low-sodium or reduced-sodium). Pickles. Olives.  Fruits  Canned fruit in a light or heavy syrup. Fried fruit. Fruit in cream or butter sauce.  Meat and other protein foods  Fatty cuts of meat. Ribs. Fried meat. Piña. Sausage. Bologna and other processed lunch meats. Salami. Fatback. Hotdogs. Bratwurst. Salted nuts and seeds. Canned beans with added salt. Canned or smoked fish. Whole eggs or egg yolks. Chicken or turkey with skin.  Dairy  Whole or 2% milk, cream, and half-and-half. Whole or full-fat cream cheese. Whole-fat or sweetened yogurt. Full-fat cheese. Nondairy creamers. Whipped toppings.  Processed cheese and cheese spreads.  Fats and oils  Butter. Stick margarine. Lard. Shortening. Ghee. Piña fat. Tropical oils, such as coconut, palm kernel, or palm oil.  Seasoning and other foods  Salted popcorn and pretzels. Onion salt, garlic salt, seasoned salt, table salt, and sea salt. Worcestershire sauce. Tartar sauce. Barbecue sauce. Teriyaki sauce. Soy sauce, including reduced-sodium. Steak sauce. Canned and packaged gravies. Fish sauce. Oyster sauce. Cocktail sauce. Horseradish that you find on the shelf. Ketchup. Mustard. Meat flavorings and tenderizers. Bouillon cubes. Hot sauce and Tabasco sauce. Premade or packaged marinades. Premade or packaged taco seasonings. Relishes. Regular salad dressings.  Where to find more information:  · National Heart, [employer], [employer] [place]: www.nhlbi.nih.gov  · American Heart Association: www.heart.org  Summary  · The DASH eating plan is a healthy eating plan that has been shown to reduce high blood pressure (hypertension). It may also reduce your risk for type 2 diabetes, heart disease, and stroke.  · With the DASH eating plan, you should limit salt (sodium) intake to 2,300 mg a day. If you have hypertension, you may need to reduce your sodium intake to 1,500 mg a day.  · When on the DASH eating plan, aim to eat more fresh fruits and vegetables, whole grains, lean proteins, low-fat dairy, and heart-healthy fats.  · Work with your health care provider or diet and nutrition specialist (dietitian) to adjust your eating plan to your individual calorie needs.  This information is not intended to replace advice given to you by your health care provider. Make sure you discuss any questions you have with your health care provider.  Document Released: 12/06/2012 Document Revised: 12/11/2017 Document Reviewed: 12/11/2017  [employer] Interactive Patient Education © 2018 [employer]    Heart-Healthy Eating Plan  Heart-healthy meal planning includes:  · Limiting unhealthy  "fats.  · Increasing healthy fats.  · Making other small dietary changes.    You may need to talk with your doctor or a diet specialist (dietitian) to create an eating plan that is right for you.  What types of fat should I choose?  · Choose healthy fats. These include olive oil and canola oil, flaxseeds, walnuts, almonds, and seeds.  · Eat more omega-3 fats. These include salmon, mackerel, sardines, tuna, flaxseed oil, and ground flaxseeds. Try to eat fish at least twice each week.  · Limit saturated fats.  ? Saturated fats are often found in animal products, such as meats, butter, and cream.  ? Plant sources of saturated fats include palm oil, palm kernel oil, and coconut oil.  · Avoid foods with partially hydrogenated oils in them. These include stick margarine, some tub margarines, cookies, crackers, and other baked goods. These contain trans fats.  What general guidelines do I need to follow?  · Check food labels carefully. Identify foods with trans fats or high amounts of saturated fat.  · Fill one half of your plate with vegetables and green salads. Eat 4-5 servings of vegetables per day. A serving of vegetables is:  ? 1 cup of raw leafy vegetables.  ? ½ cup of raw or cooked cut-up vegetables.  ? ½ cup of vegetable juice.  · Fill one fourth of your plate with whole grains. Look for the word \"whole\" as the first word in the ingredient list.  · Fill one fourth of your plate with lean protein foods.  · Eat 4-5 servings of fruit per day. A serving of fruit is:  ? One medium whole fruit.  ? ¼ cup of dried fruit.  ? ½ cup of fresh, frozen, or canned fruit.  ? ½ cup of 100% fruit juice.  · Eat more foods that contain soluble fiber. These include apples, broccoli, carrots, beans, peas, and barley. Try to get 20-30 g of fiber per day.  · Eat more home-cooked food. Eat less restaurant, buffet, and fast food.  · Limit or avoid alcohol.  · Limit foods high in starch and sugar.  · Avoid fried foods.  · Avoid frying your " food. Try baking, boiling, grilling, or broiling it instead. You can also reduce fat by:  ? Removing the skin from poultry.  ? Removing all visible fats from meats.  ? Skimming the fat off of stews, soups, and gravies before serving them.  ? Steaming vegetables in water or broth.  · Lose weight if you are overweight.  · Eat 4-5 servings of nuts, legumes, and seeds per week:  ? One serving of dried beans or legumes equals ½ cup after being cooked.  ? One serving of nuts equals 1½ ounces.  ? One serving of seeds equals ½ ounce or one tablespoon.  · You may need to keep track of how much salt or sodium you eat. This is especially true if you have high blood pressure. Talk with your doctor or dietitian to get more information.  What foods can I eat?  Grains  Breads, including Icelandic, white, la, wheat, raisin, rye, oatmeal, and Italian. Tortillas that are neither fried nor made with lard or trans fat. Low-fat rolls, including hotdog and hamburger buns and English muffins. Biscuits. Muffins. Waffles. Pancakes. Light popcorn. Whole-grain cereals. Flatbread. Kim toast. Pretzels. Breadsticks. Rusks. Low-fat snacks. Low-fat crackers, including oyster, saltine, matzo, irving, animal, and rye. Rice and pasta, including brown rice and pastas that are made with whole wheat.  Vegetables  All vegetables.  Fruits  All fruits, but limit coconut.  Meats and Other Protein Sources  Lean, well-trimmed beef, veal, pork, and lamb. Chicken and turkey without skin. All fish and shellfish. Wild duck, rabbit, pheasant, and venison. Egg whites or low-cholesterol egg substitutes. Dried beans, peas, lentils, and tofu. Seeds and most nuts.  Dairy  Low-fat or nonfat cheeses, including ricotta, string, and mozzarella. Skim or 1% milk that is liquid, powdered, or evaporated. Buttermilk that is made with low-fat milk. Nonfat or low-fat yogurt.  Beverages  Mineral water. Diet carbonated beverages.  Sweets and Desserts  Sherbets and fruit ices.  Honey, jam, marmalade, jelly, and syrups. Meringues and gelatins. Pure sugar candy, such as hard candy, jelly beans, gumdrops, mints, marshmallows, and small amounts of dark chocolate. Lul food cake.  Eat all sweets and desserts in moderation.  Fats and Oils  Nonhydrogenated (trans-free) margarines. Vegetable oils, including soybean, sesame, sunflower, olive, peanut, safflower, corn, canola, and cottonseed. Salad dressings or mayonnaise made with a vegetable oil. Limit added fats and oils that you use for cooking, baking, salads, and as spreads.  Other  Cocoa powder. Coffee and tea. All seasonings and condiments.  The items listed above may not be a complete list of recommended foods or beverages. Contact your dietitian for more options.  What foods are not recommended?  Grains  Breads that are made with saturated or trans fats, oils, or whole milk. Croissants. Butter rolls. Cheese breads. Sweet rolls. Donuts. Buttered popcorn. Chow mein noodles. High-fat crackers, such as cheese or butter crackers.  Meats and Other Protein Sources  Fatty meats, such as hotdogs, short ribs, sausage, spareribs, slaughter, rib eye roast or steak, and mutton. High-fat deli meats, such as salami and bologna. Caviar. Domestic duck and goose. Organ meats, such as kidney, liver, sweetbreads, and heart.  Dairy  Cream, sour cream, cream cheese, and creamed cottage cheese. Whole-milk cheeses, including blue (promise), Trinity Skinny, Brie, Tyson, American, Havarti, Swiss, cheddar, Camembert, and [place]. Whole or 2% milk that is liquid, evaporated, or condensed. Whole buttermilk. Cream sauce or high-fat cheese sauce. Yogurt that is made from whole milk.  Beverages  Regular sodas and juice drinks with added sugar.  Sweets and Desserts  Frosting. Pudding. Cookies. Cakes other than lul food cake. Candy that has milk chocolate or white chocolate, hydrogenated fat, butter, coconut, or unknown ingredients. Buttered syrups. Full-fat ice cream or ice  cream drinks.  Fats and Oils  Gravy that has suet, meat fat, or shortening. Cocoa butter, hydrogenated oils, palm oil, coconut oil, palm kernel oil. These can often be found in baked products, candy, fried foods, nondairy creamers, and whipped toppings. Solid fats and shortenings, including slaughter fat, salt pork, lard, and butter. Nondairy cream substitutes, such as coffee creamers and sour cream substitutes. Salad dressings that are made of unknown oils, cheese, or sour cream.  The items listed above may not be a complete list of foods and beverages to avoid. Contact your dietitian for more information.  This information is not intended to replace advice given to you by your health care provider. Make sure you discuss any questions you have with your health care provider.  Document Released: 06/18/2013 Document Revised: 05/25/2017 Document Reviewed: 06/11/2015  [employer] Interactive Patient Education © 2018 [employer]

## 2018-09-17 ENCOUNTER — OFFICE VISIT (OUTPATIENT)
Dept: OBSTETRICS AND GYNECOLOGY | Facility: CLINIC | Age: 33
End: 2018-09-17

## 2018-09-17 VITALS
SYSTOLIC BLOOD PRESSURE: 114 MMHG | HEIGHT: 64 IN | DIASTOLIC BLOOD PRESSURE: 80 MMHG | WEIGHT: 211 LBS | HEART RATE: 72 BPM | BODY MASS INDEX: 36.02 KG/M2

## 2018-09-17 DIAGNOSIS — Z91.89 RELIES ON PARTNER VASECTOMY FOR CONTRACEPTION: ICD-10-CM

## 2018-09-17 DIAGNOSIS — Z01.419 ENCOUNTER FOR GYNECOLOGICAL EXAMINATION WITHOUT ABNORMAL FINDING: Primary | ICD-10-CM

## 2018-09-17 PROCEDURE — 99385 PREV VISIT NEW AGE 18-39: CPT | Performed by: NURSE PRACTITIONER

## 2018-09-17 PROCEDURE — G0123 SCREEN CERV/VAG THIN LAYER: HCPCS | Performed by: NURSE PRACTITIONER

## 2018-09-17 PROCEDURE — 87624 HPV HI-RISK TYP POOLED RSLT: CPT | Performed by: NURSE PRACTITIONER

## 2018-09-18 PROBLEM — Z91.89 RELIES ON PARTNER VASECTOMY FOR CONTRACEPTION: Status: ACTIVE | Noted: 2018-09-18

## 2018-09-18 NOTE — PROGRESS NOTES
Subjective   Jaylon Christian is a 33 y.o. Here for pap smear. No complaints at this time.    LMP- 19; no issues  Last pap- ; no known hx of abnormal    Maternal and paternal grandmothers have hx of breast cancer with initial dx over age 50.      Gynecologic Exam   The patient's pertinent negatives include no genital itching, genital lesions, genital odor, genital rash, missed menses, pelvic pain, vaginal bleeding or vaginal discharge. Pertinent negatives include no abdominal pain, constipation, diarrhea, dysuria, headaches, nausea, rash, urgency or vomiting. She is sexually active. No, her partner does not have an STD. She uses vasectomy for contraception. Her menstrual history has been regular. Her past medical history is significant for an abdominal surgery and a terminated pregnancy. There is no history of a  section, an ectopic pregnancy, endometriosis, a gynecological surgery, herpes simplex, menorrhagia, metrorrhagia, miscarriage, ovarian cysts, perineal abscess, PID, an STD or vaginosis.       The following portions of the patient's history were reviewed and updated as appropriate: allergies, current medications, past family history, past medical history, past social history, past surgical history and problem list.    Review of Systems   Constitutional: Negative for activity change, appetite change, fatigue and unexpected weight change.   Respiratory: Negative for chest tightness and shortness of breath.    Cardiovascular: Negative for chest pain, palpitations and leg swelling.   Gastrointestinal: Negative for abdominal distention, abdominal pain, blood in stool, constipation, diarrhea, nausea and vomiting.   Endocrine: Negative for cold intolerance, heat intolerance, polydipsia, polyphagia and polyuria.   Genitourinary: Negative for difficulty urinating, dyspareunia, dysuria, genital sores, menorrhagia, menstrual problem, missed menses, pelvic pain, urgency, vaginal bleeding, vaginal discharge and  vaginal pain.   Musculoskeletal: Negative for gait problem and myalgias.   Skin: Negative for color change, pallor and rash.   Neurological: Negative for dizziness, weakness, light-headedness and headaches.   Hematological: Negative for adenopathy.   Psychiatric/Behavioral: Negative for agitation, confusion, dysphoric mood, self-injury and suicidal ideas. The patient is not nervous/anxious.      Denies breast changes; no complaints.    Objective   Physical Exam   Constitutional: She is oriented to person, place, and time. She appears well-developed and well-nourished.   Neck: No thyromegaly present.   Cardiovascular: Normal rate, regular rhythm, normal heart sounds and intact distal pulses.    Pulmonary/Chest: Effort normal and breath sounds normal. Right breast exhibits no inverted nipple, no mass, no nipple discharge, no skin change and no tenderness. Left breast exhibits no inverted nipple, no mass, no nipple discharge, no skin change and no tenderness. Breasts are symmetrical.   Abdominal: Soft. Bowel sounds are normal. She exhibits no distension. There is no tenderness.   Genitourinary: Vagina normal and uterus normal. No breast discharge or bleeding. There is no rash, tenderness, lesion or injury on the right labia. There is no rash, tenderness, lesion or injury on the left labia. Cervix exhibits no motion tenderness, no discharge and no friability. Right adnexum displays no mass, no tenderness and no fullness. Left adnexum displays no mass, no tenderness and no fullness.   Genitourinary Comments: Pap smear obtained.   Lymphadenopathy:     She has no axillary adenopathy.        Right: No inguinal adenopathy present.        Left: No inguinal adenopathy present.   Neurological: She is alert and oriented to person, place, and time.   Skin: Skin is warm, dry and intact.   Psychiatric: She has a normal mood and affect. Her speech is normal and behavior is normal.   Nursing note and vitals  reviewed.        Assessment/Plan   Jaylon was seen today for gynecologic exam.    Diagnoses and all orders for this visit:    Encounter for gynecological examination without abnormal finding  -     Liquid-based Pap Smear, Screening    Relies on partner vasectomy for contraception

## 2018-09-20 LAB
LAB AP CASE REPORT: NORMAL
LAB AP GYN ADDITIONAL INFORMATION: NORMAL
PATH INTERP SPEC-IMP: NORMAL
STAT OF ADQ CVX/VAG CYTO-IMP: NORMAL

## 2018-09-24 LAB — HPV I/H RISK 4 DNA CVX QL PROBE+SIG AMP: NEGATIVE

## 2018-11-01 ENCOUNTER — OFFICE VISIT (OUTPATIENT)
Dept: FAMILY MEDICINE CLINIC | Facility: CLINIC | Age: 33
End: 2018-11-01

## 2018-11-01 VITALS
DIASTOLIC BLOOD PRESSURE: 70 MMHG | WEIGHT: 213 LBS | HEIGHT: 64 IN | BODY MASS INDEX: 36.37 KG/M2 | TEMPERATURE: 97.9 F | HEART RATE: 60 BPM | SYSTOLIC BLOOD PRESSURE: 108 MMHG | OXYGEN SATURATION: 98 %

## 2018-11-01 DIAGNOSIS — E66.01 MORBID OBESITY (HCC): ICD-10-CM

## 2018-11-01 DIAGNOSIS — E78.5 DYSLIPIDEMIA: ICD-10-CM

## 2018-11-01 DIAGNOSIS — Z87.891 FORMER SMOKER: ICD-10-CM

## 2018-11-01 DIAGNOSIS — Z23 NEED FOR INFLUENZA VACCINATION: ICD-10-CM

## 2018-11-01 DIAGNOSIS — Z00.00 GENERAL MEDICAL EXAMINATION: ICD-10-CM

## 2018-11-01 DIAGNOSIS — F32.A DEPRESSION, UNSPECIFIED DEPRESSION TYPE: Primary | ICD-10-CM

## 2018-11-01 DIAGNOSIS — Z71.6 TOBACCO ABUSE COUNSELING: ICD-10-CM

## 2018-11-01 PROCEDURE — 99214 OFFICE O/P EST MOD 30 MIN: CPT | Performed by: FAMILY MEDICINE

## 2018-11-01 PROCEDURE — 90686 IIV4 VACC NO PRSV 0.5 ML IM: CPT | Performed by: FAMILY MEDICINE

## 2018-11-01 PROCEDURE — 90471 IMMUNIZATION ADMIN: CPT | Performed by: FAMILY MEDICINE

## 2018-11-01 RX ORDER — BUPROPION HYDROCHLORIDE 150 MG/1
150 TABLET, EXTENDED RELEASE ORAL EVERY 12 HOURS SCHEDULED
Qty: 60 TABLET | Refills: 6 | Status: SHIPPED | OUTPATIENT
Start: 2018-11-01 | End: 2020-06-24

## 2018-11-01 NOTE — PATIENT INSTRUCTIONS
Get labwork in a January 2019    Try intermittent fasting - look on youtube    Start on wellbutrin 150 mg twice a day      Recheck in 5 months or earlier if any issues arise

## 2019-03-31 PROBLEM — Z87.891 FORMER TOBACCO USE: Status: ACTIVE | Noted: 2019-03-31

## 2019-03-31 PROBLEM — E66.9 CLASS 2 OBESITY WITH BODY MASS INDEX (BMI) OF 36.0 TO 36.9 IN ADULT: Status: ACTIVE | Noted: 2019-03-31

## 2020-06-16 NOTE — PROGRESS NOTES
Subjective:  Jaylon Christian is a 34 y.o. female who presents for       Patient Active Problem List   Diagnosis   • Annual physical exam   • General medical examination   • Morbid obesity (CMS/HCC)   • Need for vaccination   • Tobacco user   • Encounter for screening for endocrine disorder   • Encounter for screening for diabetes mellitus   • Encounter for screening for cardiovascular disorders   • Relies on partner vasectomy for contraception   • Tobacco abuse counseling   • Dyslipidemia   • Need for influenza vaccination   • Depression   • Former smoker   • Class 2 obesity with body mass index (BMI) of 36.0 to 36.9 in adult   • Former tobacco use         No current outpatient medications on file.        Pt is 35 yo female with management of obesity, major depression, former smoker, dyslipdiemia     11/1/18 pt is here for recheck and followup. She is here for rehceck. Pt has history of morbid obesity.  She is a tobacco smoker. She has dysipidemia but ASCVD risk is low. Her pap smear with ROBYN Good on 9/17/18 was normal. For smoking cessation she is on Wellbutrin 75 mg PO BId and nicotine patch. She did gain 6 lbs since last visit. She stopped smoking since summer this year. She states wellbutirn is not helping with depression. She has no motivation to do the things she likes.  She is not suicidal.  She is not sure what is causing depression. It may stem from childhood    6/24/20 in office visit for recheck for followup on obesity, major depression. Pt is due for new labwork  . 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 Denies trauma no fall. Tried aleve and ibuprofen and tylenol PRN  She still has no relief. She is also concerned about her weight.           Shoulder Injury    The left shoulder is affected. The quality of the pain is described as aching and burning. The pain does not radiate. The pain is at a severity of 4/10. The pain is moderate.  Associated symptoms include muscle weakness, numbness and tingling. Pertinent negatives include no chest pain. The symptoms are aggravated by movement, overhead lifting and palpation. She has tried NSAIDs for the symptoms. The treatment provided no relief.   Obesity   This is a chronic problem. The current episode started more than 1 year ago. The problem occurs constantly. The problem has been unchanged. Associated symptoms include fatigue. Pertinent negatives include no abdominal pain, anorexia, arthralgias, change in bowel habit, chest pain, chills, congestion, coughing, diaphoresis, fever, headaches, joint swelling, myalgias, nausea, neck pain, numbness, rash, sore throat, swollen glands, urinary symptoms, vertigo, visual change or vomiting. Nothing aggravates the symptoms. She has tried nothing for the symptoms. The treatment provided no relief.       Review of Systems  Review of Systems   Constitutional: Positive for activity change and fatigue. Negative for appetite change, chills, diaphoresis and fever.   HENT: Negative for congestion, postnasal drip, rhinorrhea, sinus pressure, sinus pain, sneezing, sore throat, trouble swallowing and voice change.    Respiratory: Negative for cough, choking, chest tightness, shortness of breath, wheezing and stridor.    Cardiovascular: Negative for chest pain.   Gastrointestinal: Negative for diarrhea, nausea and vomiting.   Musculoskeletal: Positive for arthralgias.   Neurological: Positive for tingling, weakness and numbness. Negative for headaches.       Patient Active Problem List   Diagnosis   • Annual physical exam   • General medical examination   • Morbid obesity (CMS/HCC)   • Need for vaccination   • Tobacco user   • Encounter for screening for endocrine disorder   • Encounter for screening for diabetes mellitus   • Encounter for screening for cardiovascular disorders   • Relies on partner vasectomy for contraception   • Tobacco abuse counseling   • Dyslipidemia    • Need for influenza vaccination   • Depression   • Former smoker   • Class 2 obesity with body mass index (BMI) of 36.0 to 36.9 in adult   • Former tobacco use     No past surgical history on file.  Social History     Socioeconomic History   • Marital status:      Spouse name: Not on file   • Number of children: Not on file   • Years of education: Not on file   • Highest education level: Not on file   Tobacco Use   • Smoking status: Former Smoker     Types: Cigarettes     Last attempt to quit: 2018     Years since quittin.9   • Smokeless tobacco: Never Used   Substance and Sexual Activity   • Alcohol use: Yes     Alcohol/week: 2.0 standard drinks     Types: 2 Glasses of wine per week   • Drug use: No   • Sexual activity: Yes     Partners: Male     Birth control/protection: Surgical     Family History   Adopted: Yes   Problem Relation Age of Onset   • Diabetes Maternal Aunt    • Heart disease Maternal Grandmother    • Breast cancer Maternal Grandmother    • Alcohol abuse Paternal Grandfather    • No Known Problems Mother    • No Known Problems Brother    • No Known Problems Sister    • Migraines Daughter    • Breast cancer Paternal Grandmother    • No Known Problems Brother    • No Known Problems Sister      No visits with results within 6 Month(s) from this visit.   Latest known visit with results is:   Office Visit on 2018   Component Date Value Ref Range Status   • Case Report 2018    Final                    Value:Gynecologic Cytology Report                       Case: [id number]                                  Authorizing Provider:  Vivi Good APRN       Collected:           2018 03:34 PM          Ordering Location:     [facility]     Received:            2018 03:34 PM                                 [facility]                                                                 First Screen:          Afsaneh Reese                                      "                     Specimen:    Liquid-Based Pap, Screening, Cervix                                                       • Interpretation 09/17/2018 Negative for intraepithelial lesion or malignancy    Final   • Specimen Adequacy 09/17/2018 Satisfactory for evaluation, endocervical/transformation zone component present   Final   • Additional Information 09/17/2018    Final                    Value:This result contains rich text formatting which cannot be displayed here.   • HPV Aptima 09/17/2018 Negative  Negative Final    This test detects fourteen high-risk HPV types (16/18/31/33/35/39/45/  51/52/56/58/59/66/68) without differentiation.      No image results found.    [unfilled]  Immunization History   Administered Date(s) Administered   • FLUARIX/FLUZONE/AFLURIA/FLULAVAL QUAD 11/01/2018   • Pneumococcal Polysaccharide (PPSV23) 07/17/2018   • Tdap 07/17/2018       The following portions of the patient's history were reviewed and updated as appropriate: allergies, current medications, past family history, past medical history, past social history, past surgical history and problem list.        Physical Exam  /68 (BP Location: Right arm, Patient Position: Sitting, Cuff Size: Adult)   Pulse 80   Temp 98.4 °F (36.9 °C) (Temporal)   Ht 162.6 cm (64\")   Wt 99.1 kg (218 lb 6.4 oz)   LMP 06/10/2020   SpO2 98%   BMI 37.49 kg/m²     Physical Exam   Constitutional: She is oriented to person, place, and time. She appears well-developed and well-nourished.   HENT:   Head: Normocephalic and atraumatic.   Right Ear: External ear normal.   Eyes: Pupils are equal, round, and reactive to light. Conjunctivae and EOM are normal.   Neck: Normal range of motion. Neck supple.   Cardiovascular: Normal rate, regular rhythm and normal heart sounds.   No murmur heard.  Pulmonary/Chest: Effort normal and breath sounds normal. No respiratory distress.   Abdominal: Soft. Bowel sounds are normal. She exhibits no distension. " There is no tenderness.   Obese abdomen   Musculoskeletal: She exhibits tenderness. She exhibits no edema or deformity.        Left shoulder: She exhibits decreased range of motion, tenderness, bony tenderness, pain and spasm.   Neurological: She is alert and oriented to person, place, and time. No cranial nerve deficit.   Skin: Skin is warm. No rash noted. She is not diaphoretic. No erythema. No pallor.   Psychiatric: She has a normal mood and affect. Her behavior is normal.   Nursing note and vitals reviewed.      Assessment/Plan    Diagnosis Plan   1. Dyslipidemia     2. Depression, unspecified depression type     3. Class 2 obesity without serious comorbidity with body mass index (BMI) of 37.0 to 37.9 in adult, unspecified obesity type        -recommend labwork  -annual physical exam today   -right shoulder pain - x-ray of shoulder. Start on mobic 15 mg daily. And tumeric 500 mg PO q daily.consider orthopedic. Recommend PT/OT but will hold for now    -dyslipidemia-  ASCVD risk low. Gave DASH diet and heart healthy diet information today   -obesity -counseled weght loss >5 minutes BMI at 37.5   -depression - recommend counseling. Gave pt number and contact to make appt with [facility]. Also will go up on dosage of wellbutrin from 75 to 150 mg twice a day. Pt states today she is not suicidaly   -tobacco user - pt has quit smoking fShe is doing well. Continue on wellbutrin and nicointe patch   -advised pt to be safe and call with questions and concerns  -advised pt to go to ER or call 911 if symptoms worrisome or severe  -advised pt to followup with specialist and referrals  -advised pt to e safe during COVID 19 pandemic  -total time with pt >25 minutes   -recheck in 2 weeks         This document has been electronically signed by Marino Alvarado MD on June 24, 2020 15:10

## 2020-06-24 ENCOUNTER — OFFICE VISIT (OUTPATIENT)
Dept: FAMILY MEDICINE CLINIC | Facility: CLINIC | Age: 35
End: 2020-06-24

## 2020-06-24 VITALS
HEIGHT: 64 IN | TEMPERATURE: 98.4 F | OXYGEN SATURATION: 98 % | WEIGHT: 218.4 LBS | HEART RATE: 80 BPM | SYSTOLIC BLOOD PRESSURE: 106 MMHG | DIASTOLIC BLOOD PRESSURE: 68 MMHG | BODY MASS INDEX: 37.28 KG/M2

## 2020-06-24 DIAGNOSIS — M25.512 ACUTE PAIN OF LEFT SHOULDER: Primary | ICD-10-CM

## 2020-06-24 DIAGNOSIS — Z00.00 GENERAL MEDICAL EXAMINATION: ICD-10-CM

## 2020-06-24 DIAGNOSIS — E66.9 CLASS 2 OBESITY WITHOUT SERIOUS COMORBIDITY WITH BODY MASS INDEX (BMI) OF 37.0 TO 37.9 IN ADULT, UNSPECIFIED OBESITY TYPE: ICD-10-CM

## 2020-06-24 DIAGNOSIS — E78.5 DYSLIPIDEMIA: ICD-10-CM

## 2020-06-24 DIAGNOSIS — F32.A DEPRESSION, UNSPECIFIED DEPRESSION TYPE: ICD-10-CM

## 2020-06-24 DIAGNOSIS — Z00.00 ANNUAL PHYSICAL EXAM: ICD-10-CM

## 2020-06-24 PROCEDURE — 99214 OFFICE O/P EST MOD 30 MIN: CPT | Performed by: FAMILY MEDICINE

## 2020-06-24 RX ORDER — VIT C/B6/B5/MAGNESIUM/HERB 173 50-5-6-5MG
500 CAPSULE ORAL DAILY
Qty: 30 CAPSULE | Refills: 3 | Status: SHIPPED | OUTPATIENT
Start: 2020-06-24 | End: 2022-08-25

## 2020-06-24 RX ORDER — MELOXICAM 15 MG/1
15 TABLET ORAL DAILY
Qty: 30 TABLET | Refills: 3 | Status: SHIPPED | OUTPATIENT
Start: 2020-06-24 | End: 2020-06-30

## 2020-06-25 ENCOUNTER — LAB (OUTPATIENT)
Dept: LAB | Facility: HOSPITAL | Age: 35
End: 2020-06-25

## 2020-06-25 ENCOUNTER — TELEPHONE (OUTPATIENT)
Dept: FAMILY MEDICINE CLINIC | Facility: CLINIC | Age: 35
End: 2020-06-25

## 2020-06-25 DIAGNOSIS — E66.9 CLASS 2 OBESITY WITHOUT SERIOUS COMORBIDITY WITH BODY MASS INDEX (BMI) OF 37.0 TO 37.9 IN ADULT, UNSPECIFIED OBESITY TYPE: ICD-10-CM

## 2020-06-25 DIAGNOSIS — E78.5 DYSLIPIDEMIA: ICD-10-CM

## 2020-06-25 DIAGNOSIS — M19.019 AC JOINT ARTHROPATHY: ICD-10-CM

## 2020-06-25 DIAGNOSIS — M25.512 ACUTE PAIN OF LEFT SHOULDER: Primary | ICD-10-CM

## 2020-06-25 DIAGNOSIS — F32.A DEPRESSION, UNSPECIFIED DEPRESSION TYPE: ICD-10-CM

## 2020-06-25 LAB
25(OH)D3 SERPL-MCNC: 35.7 NG/ML (ref 30–100)
ALBUMIN SERPL-MCNC: 4.2 G/DL (ref 3.5–5.2)
ALBUMIN/GLOB SERPL: 1.5 G/DL
ALP SERPL-CCNC: 58 U/L (ref 39–117)
ALT SERPL W P-5'-P-CCNC: 19 U/L (ref 1–33)
ANION GAP SERPL CALCULATED.3IONS-SCNC: 10 MMOL/L (ref 5–15)
AST SERPL-CCNC: 15 U/L (ref 1–32)
BASOPHILS # BLD AUTO: 0.04 10*3/MM3 (ref 0–0.2)
BASOPHILS NFR BLD AUTO: 0.6 % (ref 0–1.5)
BILIRUB SERPL-MCNC: 0.5 MG/DL (ref 0.2–1.2)
BUN BLD-MCNC: 15 MG/DL (ref 6–20)
BUN/CREAT SERPL: 20.3 (ref 7–25)
CALCIUM SPEC-SCNC: 9.4 MG/DL (ref 8.6–10.5)
CHLORIDE SERPL-SCNC: 106 MMOL/L (ref 98–107)
CHOLEST SERPL-MCNC: 158 MG/DL (ref 0–200)
CO2 SERPL-SCNC: 23 MMOL/L (ref 22–29)
CREAT BLD-MCNC: 0.74 MG/DL (ref 0.57–1)
DEPRECATED RDW RBC AUTO: 42.7 FL (ref 37–54)
EOSINOPHIL # BLD AUTO: 0.1 10*3/MM3 (ref 0–0.4)
EOSINOPHIL NFR BLD AUTO: 1.6 % (ref 0.3–6.2)
ERYTHROCYTE [DISTWIDTH] IN BLOOD BY AUTOMATED COUNT: 12.8 % (ref 12.3–15.4)
GFR SERPL CREATININE-BSD FRML MDRD: 90 ML/MIN/1.73
GLOBULIN UR ELPH-MCNC: 2.8 GM/DL
GLUCOSE BLD-MCNC: 96 MG/DL (ref 65–99)
HBA1C MFR BLD: 5.44 % (ref 4.8–5.6)
HCT VFR BLD AUTO: 39.9 % (ref 34–46.6)
HDLC SERPL-MCNC: 61 MG/DL (ref 40–60)
HGB BLD-MCNC: 13.6 G/DL (ref 12–15.9)
IMM GRANULOCYTES # BLD AUTO: 0.01 10*3/MM3 (ref 0–0.05)
IMM GRANULOCYTES NFR BLD AUTO: 0.2 % (ref 0–0.5)
LDLC SERPL CALC-MCNC: 75 MG/DL (ref 0–100)
LDLC/HDLC SERPL: 1.24 {RATIO}
LYMPHOCYTES # BLD AUTO: 1.39 10*3/MM3 (ref 0.7–3.1)
LYMPHOCYTES NFR BLD AUTO: 21.9 % (ref 19.6–45.3)
MCH RBC QN AUTO: 31.1 PG (ref 26.6–33)
MCHC RBC AUTO-ENTMCNC: 34.1 G/DL (ref 31.5–35.7)
MCV RBC AUTO: 91.1 FL (ref 79–97)
MONOCYTES # BLD AUTO: 0.57 10*3/MM3 (ref 0.1–0.9)
MONOCYTES NFR BLD AUTO: 9 % (ref 5–12)
NEUTROPHILS # BLD AUTO: 4.25 10*3/MM3 (ref 1.7–7)
NEUTROPHILS NFR BLD AUTO: 66.7 % (ref 42.7–76)
NRBC BLD AUTO-RTO: 0 /100 WBC (ref 0–0.2)
PLATELET # BLD AUTO: 257 10*3/MM3 (ref 140–450)
PMV BLD AUTO: 11.3 FL (ref 6–12)
POTASSIUM BLD-SCNC: 4.5 MMOL/L (ref 3.5–5.2)
PROT SERPL-MCNC: 7 G/DL (ref 6–8.5)
RBC # BLD AUTO: 4.38 10*6/MM3 (ref 3.77–5.28)
SODIUM BLD-SCNC: 139 MMOL/L (ref 136–145)
T3FREE SERPL-MCNC: 3.59 PG/ML (ref 2–4.4)
T4 FREE SERPL-MCNC: 1.27 NG/DL (ref 0.93–1.7)
TRIGL SERPL-MCNC: 108 MG/DL (ref 0–150)
TSH SERPL DL<=0.05 MIU/L-ACNC: 0.93 UIU/ML (ref 0.27–4.2)
VIT B12 BLD-MCNC: 546 PG/ML (ref 211–946)
VLDLC SERPL-MCNC: 21.6 MG/DL (ref 5–40)
WBC NRBC COR # BLD: 6.36 10*3/MM3 (ref 3.4–10.8)

## 2020-06-25 PROCEDURE — 83036 HEMOGLOBIN GLYCOSYLATED A1C: CPT

## 2020-06-25 PROCEDURE — 82306 VITAMIN D 25 HYDROXY: CPT

## 2020-06-25 PROCEDURE — 80053 COMPREHEN METABOLIC PANEL: CPT

## 2020-06-25 PROCEDURE — 84443 ASSAY THYROID STIM HORMONE: CPT

## 2020-06-25 PROCEDURE — 82607 VITAMIN B-12: CPT

## 2020-06-25 PROCEDURE — 84481 FREE ASSAY (FT-3): CPT

## 2020-06-25 PROCEDURE — 85025 COMPLETE CBC W/AUTO DIFF WBC: CPT

## 2020-06-25 PROCEDURE — 84439 ASSAY OF FREE THYROXINE: CPT

## 2020-06-25 PROCEDURE — 80061 LIPID PANEL: CPT

## 2020-06-26 ENCOUNTER — TELEPHONE (OUTPATIENT)
Dept: FAMILY MEDICINE CLINIC | Facility: CLINIC | Age: 35
End: 2020-06-26

## 2020-06-26 NOTE — TELEPHONE ENCOUNTER
----- Message from Marino Alvarado MD sent at 6/26/2020  7:22 AM CDT -----  Please call pt    All labs stable including hga1c.      Thyroid studies normal    Recheck on next visit thanks

## 2020-06-30 ENCOUNTER — OFFICE VISIT (OUTPATIENT)
Dept: ORTHOPEDIC SURGERY | Facility: CLINIC | Age: 35
End: 2020-06-30

## 2020-06-30 VITALS — BODY MASS INDEX: 37.8 KG/M2 | HEIGHT: 64 IN | WEIGHT: 221.4 LBS

## 2020-06-30 DIAGNOSIS — M75.102 ROTATOR CUFF SYNDROME, LEFT: ICD-10-CM

## 2020-06-30 DIAGNOSIS — M25.512 ACUTE PAIN OF LEFT SHOULDER: Primary | ICD-10-CM

## 2020-06-30 DIAGNOSIS — M62.838 TRAPEZIUS MUSCLE SPASM: ICD-10-CM

## 2020-06-30 PROCEDURE — 99203 OFFICE O/P NEW LOW 30 MIN: CPT | Performed by: ORTHOPAEDIC SURGERY

## 2020-06-30 RX ORDER — NABUMETONE 750 MG/1
750 TABLET, FILM COATED ORAL 2 TIMES DAILY
Qty: 60 TABLET | Refills: 2 | Status: SHIPPED | OUTPATIENT
Start: 2020-06-30 | End: 2022-08-25

## 2021-05-25 ENCOUNTER — TELEPHONE (OUTPATIENT)
Dept: FAMILY MEDICINE CLINIC | Facility: CLINIC | Age: 36
End: 2021-05-25

## 2022-08-16 NOTE — PROGRESS NOTES
Subjective:  Jaylon Christian is a 37 y.o. female who presents for       Patient Active Problem List   Diagnosis   • Annual physical exam   • General medical examination   • Morbid obesity (HCC)   • Need for vaccination   • Tobacco user   • Encounter for screening for endocrine disorder   • Encounter for screening for diabetes mellitus   • Encounter for screening for cardiovascular disorders   • Relies on partner vasectomy for contraception   • Tobacco abuse counseling   • Dyslipidemia   • Need for influenza vaccination   • Depression   • Former smoker   • Class 2 obesity with body mass index (BMI) of 36.0 to 36.9 in adult   • Former tobacco use   • Morbid (severe) obesity due to excess calories (HCC)         No current outpatient medications on file.     Pt is 33 yo female with management of obesity, major depression, former smoker, dyslipdiemia        6/24/20 in office visit for recheck for followup on obesity, major depression. Pt is due for new labwork  . 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 Denies trauma no fall. Tried aleve and ibuprofen and tylenol PRN  She still has no relief. She is also concerned about her weight.      8/25/22 in office visit for recheck. Pt is due for new labwork. Her weight was at 218 in 2020 and now it is at 240 lbs. She continues to quit smoking.  She is concerned with weight. Her diet could improved. She does eat a lot of carbs and eats out at lunch.  She will be working from work soon.  Mentally she is doing overall. She also stress eating/binge eating especially over the summer.       Obesity  This is a chronic problem. The current episode started more than 1 year ago. The problem occurs constantly. The problem has been unchanged. Associated symptoms include arthralgias, fatigue, numbness and weakness. Pertinent negatives include no chest pain, chills, congestion, coughing, diaphoresis, fever, headaches, nausea, sore  throat or vomiting. Nothing aggravates the symptoms. She has tried nothing for the symptoms. The treatment provided no relief.        Review of Systems  Review of Systems   Constitutional: Positive for activity change and fatigue. Negative for appetite change, chills, diaphoresis and fever.   HENT: Negative for congestion, postnasal drip, rhinorrhea, sinus pressure, sinus pain, sneezing, sore throat, trouble swallowing and voice change.    Respiratory: Negative for cough, choking, chest tightness, shortness of breath, wheezing and stridor.    Cardiovascular: Negative for chest pain.   Gastrointestinal: Negative for diarrhea, nausea and vomiting.   Musculoskeletal: Positive for arthralgias.   Neurological: Positive for weakness and numbness. Negative for headaches.       Patient Active Problem List   Diagnosis   • Annual physical exam   • General medical examination   • Morbid obesity (HCC)   • Need for vaccination   • Tobacco user   • Encounter for screening for endocrine disorder   • Encounter for screening for diabetes mellitus   • Encounter for screening for cardiovascular disorders   • Relies on partner vasectomy for contraception   • Tobacco abuse counseling   • Dyslipidemia   • Need for influenza vaccination   • Depression   • Former smoker   • Class 2 obesity with body mass index (BMI) of 36.0 to 36.9 in adult   • Former tobacco use   • Morbid (severe) obesity due to excess calories (HCC)     Past Surgical History:   Procedure Laterality Date   • HERNIA REPAIR       Social History     Socioeconomic History   • Marital status:    Tobacco Use   • Smoking status: Former Smoker     Types: Cigarettes     Quit date: 2018     Years since quittin.1   • Smokeless tobacco: Never Used   Vaping Use   • Vaping Use: Never used   Substance and Sexual Activity   • Alcohol use: Yes     Alcohol/week: 3.0 standard drinks     Types: 3 Glasses of wine per week   • Drug use: No   • Sexual activity: Yes     Partners:  Male     Birth control/protection: Surgical     Family History   Adopted: Yes   Problem Relation Age of Onset   • Diabetes Maternal Aunt    • Heart disease Maternal Grandmother    • Breast cancer Maternal Grandmother    • Alcohol abuse Paternal Grandfather    • No Known Problems Mother    • No Known Problems Brother    • No Known Problems Sister    • Migraines Daughter    • Breast cancer Paternal Grandmother    • No Known Problems Brother    • No Known Problems Sister    • Cancer Other      No visits with results within 6 Month(s) from this visit.   Latest known visit with results is:   Lab on 06/25/2020   Component Date Value Ref Range Status   • WBC 06/25/2020 6.36  3.40 - 10.80 10*3/mm3 Final   • RBC 06/25/2020 4.38  3.77 - 5.28 10*6/mm3 Final   • Hemoglobin 06/25/2020 13.6  12.0 - 15.9 g/dL Final   • Hematocrit 06/25/2020 39.9  34.0 - 46.6 % Final   • MCV 06/25/2020 91.1  79.0 - 97.0 fL Final   • MCH 06/25/2020 31.1  26.6 - 33.0 pg Final   • MCHC 06/25/2020 34.1  31.5 - 35.7 g/dL Final   • RDW 06/25/2020 12.8  12.3 - 15.4 % Final   • RDW-SD 06/25/2020 42.7  37.0 - 54.0 fl Final   • MPV 06/25/2020 11.3  6.0 - 12.0 fL Final   • Platelets 06/25/2020 257  140 - 450 10*3/mm3 Final   • Neutrophil % 06/25/2020 66.7  42.7 - 76.0 % Final   • Lymphocyte % 06/25/2020 21.9  19.6 - 45.3 % Final   • Monocyte % 06/25/2020 9.0  5.0 - 12.0 % Final   • Eosinophil % 06/25/2020 1.6  0.3 - 6.2 % Final   • Basophil % 06/25/2020 0.6  0.0 - 1.5 % Final   • Immature Grans % 06/25/2020 0.2  0.0 - 0.5 % Final   • Neutrophils, Absolute 06/25/2020 4.25  1.70 - 7.00 10*3/mm3 Final   • Lymphocytes, Absolute 06/25/2020 1.39  0.70 - 3.10 10*3/mm3 Final   • Monocytes, Absolute 06/25/2020 0.57  0.10 - 0.90 10*3/mm3 Final   • Eosinophils, Absolute 06/25/2020 0.10  0.00 - 0.40 10*3/mm3 Final   • Basophils, Absolute 06/25/2020 0.04  0.00 - 0.20 10*3/mm3 Final   • Immature Grans, Absolute 06/25/2020 0.01  0.00 - 0.05 10*3/mm3 Final   • nRBC  06/25/2020 0.0  0.0 - 0.2 /100 WBC Final   • Glucose 06/25/2020 96  65 - 99 mg/dL Final   • BUN 06/25/2020 15  6 - 20 mg/dL Final   • Creatinine 06/25/2020 0.74  0.57 - 1.00 mg/dL Final   • Sodium 06/25/2020 139  136 - 145 mmol/L Final   • Potassium 06/25/2020 4.5  3.5 - 5.2 mmol/L Final   • Chloride 06/25/2020 106  98 - 107 mmol/L Final   • CO2 06/25/2020 23.0  22.0 - 29.0 mmol/L Final   • Calcium 06/25/2020 9.4  8.6 - 10.5 mg/dL Final   • Total Protein 06/25/2020 7.0  6.0 - 8.5 g/dL Final   • Albumin 06/25/2020 4.20  3.50 - 5.20 g/dL Final   • ALT (SGPT) 06/25/2020 19  1 - 33 U/L Final   • AST (SGOT) 06/25/2020 15  1 - 32 U/L Final   • Alkaline Phosphatase 06/25/2020 58  39 - 117 U/L Final   • Total Bilirubin 06/25/2020 0.5  0.2 - 1.2 mg/dL Final   • eGFR Non African Amer 06/25/2020 90  >60 mL/min/1.73 Final   • Globulin 06/25/2020 2.8  gm/dL Final   • A/G Ratio 06/25/2020 1.5  g/dL Final   • BUN/Creatinine Ratio 06/25/2020 20.3  7.0 - 25.0 Final   • Anion Gap 06/25/2020 10.0  5.0 - 15.0 mmol/L Final   • Hemoglobin A1C 06/25/2020 5.44  4.80 - 5.60 % Final   • Total Cholesterol 06/25/2020 158  0 - 200 mg/dL Final   • Triglycerides 06/25/2020 108  0 - 150 mg/dL Final   • HDL Cholesterol 06/25/2020 61 (A) 40 - 60 mg/dL Final   • LDL Cholesterol  06/25/2020 75  0 - 100 mg/dL Final   • VLDL Cholesterol 06/25/2020 21.6  5 - 40 mg/dL Final   • LDL/HDL Ratio 06/25/2020 1.24   Final   • TSH 06/25/2020 0.926  0.270 - 4.200 uIU/mL Final   • Free T4 06/25/2020 1.27  0.93 - 1.70 ng/dL Final   • T3, Free 06/25/2020 3.59  2.00 - 4.40 pg/mL Final   • 25 Hydroxy, Vitamin D 06/25/2020 35.7  30.0 - 100.0 ng/ml Final   • Vitamin B-12 06/25/2020 546  211 - 946 pg/mL Final      XR Shoulder 2+ View Left  Narrative: Three view left shoulder    HISTORY: Left shoulder pain    AP films with the humerus in internal and external rotation and  scapular Y view were obtained.    COMPARISON: None    FINDINGS:   Minimal widening acromioclavicular  "joint.  No fracture or dislocation.  No other osseous or articular abnormality.  Impression: CONCLUSION:  Minimal widening acromioclavicular joint.    [id number]    Electronically signed by:  Martin Vanegas MD  6/24/2020 5:00 PM CDT  Workstation: [id number]    [unfilled]  Immunization History   Administered Date(s) Administered   • COVID-19 (PFIZER) PURPLE CAP 05/25/2021, 06/15/2021   • FluLaval/Fluzone >6mos 11/01/2018, 11/01/2018   • Pneumococcal Polysaccharide (PPSV23) 07/17/2018   • Tdap 07/17/2018       The following portions of the patient's history were reviewed and updated as appropriate: allergies, current medications, past family history, past medical history, past social history, past surgical history and problem list.        Physical Exam  /62 (BP Location: Right arm, Patient Position: Sitting, Cuff Size: Large Adult)   Pulse 100   Temp 97.7 °F (36.5 °C)   Ht 162.6 cm (64\")   Wt 109 kg (240 lb 9.6 oz)   LMP 08/22/2022   SpO2 97%   BMI 41.30 kg/m²     Physical Exam  Vitals and nursing note reviewed.   Constitutional:       Appearance: She is well-developed. She is not diaphoretic.   HENT:      Head: Normocephalic and atraumatic.      Right Ear: External ear normal.   Eyes:      Conjunctiva/sclera: Conjunctivae normal.      Pupils: Pupils are equal, round, and reactive to light.   Cardiovascular:      Rate and Rhythm: Normal rate and regular rhythm.      Heart sounds: Normal heart sounds. No murmur heard.  Pulmonary:      Effort: Pulmonary effort is normal. No respiratory distress.      Breath sounds: Normal breath sounds.   Abdominal:      General: Bowel sounds are normal. There is no distension.      Palpations: Abdomen is soft.      Tenderness: There is no abdominal tenderness.      Comments: Obese abdomen    Musculoskeletal:         General: Tenderness present. No deformity. Normal range of motion.      Cervical back: Normal range of motion and neck supple.   Skin:     General: Skin is " warm.      Coloration: Skin is not pale.      Findings: No erythema or rash.   Neurological:      Mental Status: She is alert and oriented to person, place, and time.      Cranial Nerves: No cranial nerve deficit.   Psychiatric:         Behavior: Behavior normal.         [unfilled]   Diagnosis Plan   1. Morbid (severe) obesity due to excess calories (HCC)     2. Tobacco user  CBC Auto Differential    Comprehensive Metabolic Panel    Hemoglobin A1c    Lipid Panel    TSH    T4, Free    Vitamin D 25 Hydroxy    Vitamin B12    Hepatitis C antibody   3. Encounter for screening for endocrine disorder  CBC Auto Differential    Comprehensive Metabolic Panel    Hemoglobin A1c    Lipid Panel    TSH    T4, Free    Vitamin D 25 Hydroxy    Vitamin B12    Hepatitis C antibody   4. Encounter for screening for diabetes mellitus  CBC Auto Differential    Comprehensive Metabolic Panel    Hemoglobin A1c    Lipid Panel    TSH    T4, Free    Vitamin D 25 Hydroxy    Vitamin B12    Hepatitis C antibody   5. Encounter for screening for cardiovascular disorders  CBC Auto Differential    Comprehensive Metabolic Panel    Hemoglobin A1c    Lipid Panel    TSH    T4, Free    Vitamin D 25 Hydroxy    Vitamin B12    Hepatitis C antibody   6. Dyslipidemia  CBC Auto Differential    Comprehensive Metabolic Panel    Hemoglobin A1c    Lipid Panel    TSH    T4, Free    Vitamin D 25 Hydroxy    Vitamin B12    Hepatitis C antibody   7. Moderate episode of recurrent major depressive disorder (HCC)  CBC Auto Differential    Comprehensive Metabolic Panel    Hemoglobin A1c    Lipid Panel    TSH    T4, Free    Vitamin D 25 Hydroxy    Vitamin B12    Hepatitis C antibody   8. Encounter for cervical Pap smear with pelvic exam  Ambulatory Referral to Gynecology           -recommend labwork  -recommend COVID-19 vaccination  -recommend pap smear -refer to OB/GYn  -right shoulder pain - off relafen and tumeric  -dyslipidemia-  ASCVD risk low. Gave DASH diet and  heart healthy diet information today   -morbid obesity -counseled weght loss >5 minutes BMI at 41.30.  Consider weight loss surgery.  Recommend intermittent fastin.  -depression - off wellbutrin.   -tobacco user - pt has quit smoking fShe is doing well. Continue on wellbutrin and nicointe patch   -advised pt to be safe and call with questions and concerns  -advised pt to go to ER or call 911 if symptoms worrisome or severe  -advised pt to followup with specialist and referrals  -advised pt to e safe during COVID 19 pandemic  I spent 45 minutes caring for Jaylon on this date of service. This time includes time spent by me in the following activities: preparing for the visit, reviewing tests, obtaining and/or reviewing a separately obtained history, performing a medically appropriate examination and/or evaluation, counseling and educating the patient/family/caregiver, ordering medications, tests, or procedures, referring and communicating with other health care professionals, documenting information in the medical record, independently interpreting results and communicating that information with the patient/family/caregiver and care coordination.   -recheck in 3 months         This document has been electronically signed by Marino Alvarado MD on August 25, 2022 15:19 CDT

## 2022-08-25 ENCOUNTER — OFFICE VISIT (OUTPATIENT)
Dept: FAMILY MEDICINE CLINIC | Facility: CLINIC | Age: 37
End: 2022-08-25

## 2022-08-25 VITALS
SYSTOLIC BLOOD PRESSURE: 116 MMHG | HEIGHT: 64 IN | BODY MASS INDEX: 41.07 KG/M2 | TEMPERATURE: 97.7 F | HEART RATE: 100 BPM | DIASTOLIC BLOOD PRESSURE: 62 MMHG | OXYGEN SATURATION: 97 % | WEIGHT: 240.6 LBS

## 2022-08-25 DIAGNOSIS — E66.01 MORBID (SEVERE) OBESITY DUE TO EXCESS CALORIES: Primary | ICD-10-CM

## 2022-08-25 DIAGNOSIS — Z13.6 ENCOUNTER FOR SCREENING FOR CARDIOVASCULAR DISORDERS: ICD-10-CM

## 2022-08-25 DIAGNOSIS — Z01.419 ENCOUNTER FOR CERVICAL PAP SMEAR WITH PELVIC EXAM: ICD-10-CM

## 2022-08-25 DIAGNOSIS — Z13.1 ENCOUNTER FOR SCREENING FOR DIABETES MELLITUS: ICD-10-CM

## 2022-08-25 DIAGNOSIS — F33.1 MODERATE EPISODE OF RECURRENT MAJOR DEPRESSIVE DISORDER: ICD-10-CM

## 2022-08-25 DIAGNOSIS — E78.5 DYSLIPIDEMIA: ICD-10-CM

## 2022-08-25 DIAGNOSIS — Z13.29 ENCOUNTER FOR SCREENING FOR ENDOCRINE DISORDER: ICD-10-CM

## 2022-08-25 DIAGNOSIS — Z72.0 TOBACCO USER: ICD-10-CM

## 2022-08-25 PROCEDURE — 99214 OFFICE O/P EST MOD 30 MIN: CPT | Performed by: FAMILY MEDICINE

## 2022-09-01 ENCOUNTER — LAB (OUTPATIENT)
Dept: LAB | Facility: HOSPITAL | Age: 37
End: 2022-09-01

## 2022-09-01 DIAGNOSIS — Z13.1 ENCOUNTER FOR SCREENING FOR DIABETES MELLITUS: ICD-10-CM

## 2022-09-01 DIAGNOSIS — Z13.29 ENCOUNTER FOR SCREENING FOR ENDOCRINE DISORDER: ICD-10-CM

## 2022-09-01 DIAGNOSIS — F33.1 MODERATE EPISODE OF RECURRENT MAJOR DEPRESSIVE DISORDER: ICD-10-CM

## 2022-09-01 DIAGNOSIS — E78.5 DYSLIPIDEMIA: ICD-10-CM

## 2022-09-01 DIAGNOSIS — Z72.0 TOBACCO USER: ICD-10-CM

## 2022-09-01 DIAGNOSIS — Z13.6 ENCOUNTER FOR SCREENING FOR CARDIOVASCULAR DISORDERS: ICD-10-CM

## 2022-09-01 LAB
25(OH)D3 SERPL-MCNC: 41.7 NG/ML (ref 30–100)
ALBUMIN SERPL-MCNC: 4.1 G/DL (ref 3.5–5.2)
ALBUMIN/GLOB SERPL: 1.4 G/DL
ALP SERPL-CCNC: 62 U/L (ref 39–117)
ALT SERPL W P-5'-P-CCNC: 13 U/L (ref 1–33)
ANION GAP SERPL CALCULATED.3IONS-SCNC: 11.5 MMOL/L (ref 5–15)
AST SERPL-CCNC: 18 U/L (ref 1–32)
BASOPHILS # BLD AUTO: 0.05 10*3/MM3 (ref 0–0.2)
BASOPHILS NFR BLD AUTO: 0.8 % (ref 0–1.5)
BILIRUB SERPL-MCNC: 0.5 MG/DL (ref 0–1.2)
BUN SERPL-MCNC: 12 MG/DL (ref 6–20)
BUN/CREAT SERPL: 15.8 (ref 7–25)
CALCIUM SPEC-SCNC: 9 MG/DL (ref 8.6–10.5)
CHLORIDE SERPL-SCNC: 105 MMOL/L (ref 98–107)
CHOLEST SERPL-MCNC: 163 MG/DL (ref 0–200)
CO2 SERPL-SCNC: 21.5 MMOL/L (ref 22–29)
CREAT SERPL-MCNC: 0.76 MG/DL (ref 0.57–1)
DEPRECATED RDW RBC AUTO: 42.5 FL (ref 37–54)
EGFRCR SERPLBLD CKD-EPI 2021: 104.3 ML/MIN/1.73
EOSINOPHIL # BLD AUTO: 0.13 10*3/MM3 (ref 0–0.4)
EOSINOPHIL NFR BLD AUTO: 2.1 % (ref 0.3–6.2)
ERYTHROCYTE [DISTWIDTH] IN BLOOD BY AUTOMATED COUNT: 12.5 % (ref 12.3–15.4)
GLOBULIN UR ELPH-MCNC: 2.9 GM/DL
GLUCOSE SERPL-MCNC: 83 MG/DL (ref 65–99)
HBA1C MFR BLD: 5.5 % (ref 4.8–5.6)
HCT VFR BLD AUTO: 40.9 % (ref 34–46.6)
HCV AB SER DONR QL: NORMAL
HDLC SERPL-MCNC: 66 MG/DL (ref 40–60)
HGB BLD-MCNC: 13.9 G/DL (ref 12–15.9)
IMM GRANULOCYTES # BLD AUTO: 0.02 10*3/MM3 (ref 0–0.05)
IMM GRANULOCYTES NFR BLD AUTO: 0.3 % (ref 0–0.5)
LDLC SERPL CALC-MCNC: 84 MG/DL (ref 0–100)
LDLC/HDLC SERPL: 1.27 {RATIO}
LYMPHOCYTES # BLD AUTO: 1.52 10*3/MM3 (ref 0.7–3.1)
LYMPHOCYTES NFR BLD AUTO: 25 % (ref 19.6–45.3)
MCH RBC QN AUTO: 31.5 PG (ref 26.6–33)
MCHC RBC AUTO-ENTMCNC: 34 G/DL (ref 31.5–35.7)
MCV RBC AUTO: 92.7 FL (ref 79–97)
MONOCYTES # BLD AUTO: 0.44 10*3/MM3 (ref 0.1–0.9)
MONOCYTES NFR BLD AUTO: 7.2 % (ref 5–12)
NEUTROPHILS NFR BLD AUTO: 3.91 10*3/MM3 (ref 1.7–7)
NEUTROPHILS NFR BLD AUTO: 64.6 % (ref 42.7–76)
NRBC BLD AUTO-RTO: 0 /100 WBC (ref 0–0.2)
PLATELET # BLD AUTO: 290 10*3/MM3 (ref 140–450)
PMV BLD AUTO: 10.6 FL (ref 6–12)
POTASSIUM SERPL-SCNC: 4.3 MMOL/L (ref 3.5–5.2)
PROT SERPL-MCNC: 7 G/DL (ref 6–8.5)
RBC # BLD AUTO: 4.41 10*6/MM3 (ref 3.77–5.28)
SODIUM SERPL-SCNC: 138 MMOL/L (ref 136–145)
T4 FREE SERPL-MCNC: 1.14 NG/DL (ref 0.93–1.7)
TRIGL SERPL-MCNC: 65 MG/DL (ref 0–150)
TSH SERPL DL<=0.05 MIU/L-ACNC: 0.56 UIU/ML (ref 0.27–4.2)
VIT B12 BLD-MCNC: 576 PG/ML (ref 211–946)
VLDLC SERPL-MCNC: 13 MG/DL (ref 5–40)
WBC NRBC COR # BLD: 6.07 10*3/MM3 (ref 3.4–10.8)

## 2022-09-01 PROCEDURE — 82306 VITAMIN D 25 HYDROXY: CPT

## 2022-09-01 PROCEDURE — 82607 VITAMIN B-12: CPT

## 2022-09-01 PROCEDURE — 80053 COMPREHEN METABOLIC PANEL: CPT

## 2022-09-01 PROCEDURE — 84443 ASSAY THYROID STIM HORMONE: CPT

## 2022-09-01 PROCEDURE — 84439 ASSAY OF FREE THYROXINE: CPT

## 2022-09-01 PROCEDURE — 83036 HEMOGLOBIN GLYCOSYLATED A1C: CPT

## 2022-09-01 PROCEDURE — 80061 LIPID PANEL: CPT

## 2022-09-01 PROCEDURE — 85025 COMPLETE CBC W/AUTO DIFF WBC: CPT

## 2022-09-01 PROCEDURE — 86803 HEPATITIS C AB TEST: CPT
